# Patient Record
Sex: MALE | Race: WHITE | NOT HISPANIC OR LATINO | Employment: OTHER | URBAN - METROPOLITAN AREA
[De-identification: names, ages, dates, MRNs, and addresses within clinical notes are randomized per-mention and may not be internally consistent; named-entity substitution may affect disease eponyms.]

---

## 2017-07-25 ENCOUNTER — GENERIC CONVERSION - ENCOUNTER (OUTPATIENT)
Dept: OTHER | Facility: OTHER | Age: 62
End: 2017-07-25

## 2018-03-05 RX ORDER — ATENOLOL 50 MG/1
50 TABLET ORAL 2 TIMES DAILY
COMMUNITY

## 2018-03-05 RX ORDER — LEVOTHYROXINE SODIUM 0.1 MG/1
100 TABLET ORAL DAILY
COMMUNITY
End: 2021-04-20

## 2018-03-05 RX ORDER — TAMSULOSIN HYDROCHLORIDE 0.4 MG/1
0.4 CAPSULE ORAL DAILY
COMMUNITY

## 2018-03-05 RX ORDER — LISINOPRIL 20 MG/1
40 TABLET ORAL DAILY
COMMUNITY

## 2018-03-06 ENCOUNTER — OFFICE VISIT (OUTPATIENT)
Dept: SURGICAL ONCOLOGY | Facility: CLINIC | Age: 63
End: 2018-03-06
Payer: COMMERCIAL

## 2018-03-06 VITALS
HEART RATE: 87 BPM | HEIGHT: 69 IN | TEMPERATURE: 97.6 F | WEIGHT: 197 LBS | SYSTOLIC BLOOD PRESSURE: 146 MMHG | RESPIRATION RATE: 16 BRPM | BODY MASS INDEX: 29.18 KG/M2 | DIASTOLIC BLOOD PRESSURE: 88 MMHG

## 2018-03-06 DIAGNOSIS — C18.9 MALIGNANT NEOPLASM OF COLON, UNSPECIFIED PART OF COLON (HCC): Primary | ICD-10-CM

## 2018-03-06 PROCEDURE — 99213 OFFICE O/P EST LOW 20 MIN: CPT | Performed by: SURGERY

## 2018-03-06 RX ORDER — OMEPRAZOLE 40 MG/1
40 CAPSULE, DELAYED RELEASE ORAL DAILY
COMMUNITY
End: 2021-04-20

## 2018-03-06 NOTE — LETTER
March 6, 2018     Klever Diallo, [facility] [address]    Patient: Padmini Nava   YOB: 1955   Date of Visit: 3/6/2018       Dear Dr Vahe Restrepo: Thank you for referring Padmini Nava to me for evaluation  Below are my notes for this consultation  If you have questions, please do not hesitate to call me  I look forward to following your patient along with you  Sincerely,        Narda Cleveland MD        CC: MD Paul Duarte MD Dottie Spring, MD  3/6/2018 11:43 AM  Sign at close encounter               Surgical Oncology Follow Up       [address]  1955  [id number]  [address] [facility] SURGICAL ONCOLOGY [address] [address]    No chief complaint on file  No history exists  Diagnosis and Staging: [id number] colon carcinoma June 2012   Treatment History: Ilio transverse colectomy in June 2012  Adjuvant Chemotherapy XELOX   Current Therapy: Observation   Disease Status: LUPIS       History of Present Illness:   Patient returns in follow-up of his colon carcinoma  He is doing well at this time with no complaints  His last imaging last year was stable  His CEA has been normal  He recently developed some epigastric discomfort  He was ultimately diagnosed with gastritis after upper endoscopy  He is on Prilosec and takes a Zantac as well and this has markedly improved  His appetite is good  No unintentional weight loss  He is up-to-date on colonoscopy  Review of Systems  Complete ROS Surg Onc:   Complete ROS Surg Onc:   Constitutional: The patient denies new or recent history of general fatigue, no recent weight loss, no change in appetite  Eyes: No complaints of visual problems, no scleral icterus     ENT: no complaints of ear pain, no hoarseness, no difficulty swallowing,  no tinnitus and no new masses in head, oral cavity, or neck  Cardiovascular: No complaints of chest pain, no palpitations, no ankle edema  Respiratory: No complaints of shortness of breath, no cough  Gastrointestinal: No complaints of jaundice, no bloody stools, no pale stools  Genitourinary: No complaints of dysuria, no hematuria, no nocturia, no frequent urination, no urethral discharge  Musculoskeletal: No complaints of weakness, paralysis, joint stiffness or arthralgias  Integumentary: No complaints of rash, no new lesions  Neurological: No complaints of convulsions, no seizures, no dizziness  Hematologic/Lymphatic: No complaints of easy bruising  Endocrine:  No hot or cold intolerance  No polydipsia, polyphagia, or polyuria  Allergy/immunology:  No environmental allergies  No food allergies  Not immunocompromised  Skin:  No pallor or rash  No wound  Patient Active Problem List   Diagnosis    Hypertension    Hypothyroidism    Renal colic     No past medical history on file  No past surgical history on file  No family history on file  Social History     Social History    Marital status: /Civil Union     Spouse name: N/A    Number of children: N/A    Years of education: N/A     Occupational History    Not on file       Social History Main Topics    Smoking status: Never Smoker    Smokeless tobacco: Never Used    Alcohol use Not on file    Drug use: Unknown    Sexual activity: Not on file     Other Topics Concern    Not on file     Social History Narrative    No narrative on file       Current Outpatient Prescriptions:     omeprazole (PriLOSEC) 40 MG capsule, Take 40 mg by mouth daily, Disp: , Rfl:     atenolol (TENORMIN) 50 mg tablet, Take by mouth, Disp: , Rfl:     levothyroxine (SYNTHROID) 150 mcg tablet, Take by mouth, Disp: , Rfl:     lisinopril (ZESTRIL) 5 mg tablet, Take by mouth, Disp: , Rfl:     tamsulosin (FLOMAX) 0 4 mg, Take by mouth, Disp: , Rfl:   Allergies   Allergen Reactions    Sulfa Antibiotics Hives and Rash     Vitals:    03/06/18 1016   BP: 146/88   Pulse: 87   Resp: 16   Temp: 97 6 °F (36 4 °C)       Physical Exam  Constitutional: General appearance: The Patient is well-developed and well-nourished who appears the stated age in no acute distress  Patient is pleasant and talkative  HEENT:  Normocephalic  Sclerae are anicteric  Mucous membranes are moist  Neck is supple without adenopathy  No JVD  Chest: The lungs are clear to auscultation  Cardiac: Heart is regular rate  Abdomen: Abdomen is soft, non-tender, non-distended and without masses  Extremities: There is no clubbing or cyanosis  There is no edema  Symmetric  Neuro: Grossly nonfocal  Gait is normal      Lymphatic: No evidence of cervical adenopathy bilaterally  No evidence of axillary adenopathy bilaterally  No evidence of inguinal adenopathy bilaterally  Skin: Warm, anicteric  Psych:  Patient is pleasant and talkative  Pathology:      Labs:      Imaging  No results found  I reviewed the above laboratory and imaging data  Discussion/Summary:     78-year-old male status post ilio transverse colectomy for [id number] colon carcinoma  He is clinically LUPIS at nearly 6 years  His CEA has been normal  He is doing very well  Since he is over 5 years out from his surgery and he continues to follow up with his medical oncologist, I will see him again in the future if any of his imaging or endoscopy is abnormal   He is agreeable to this  All his questions were answered

## 2018-03-06 NOTE — PROGRESS NOTES
Surgical Oncology Follow Up       [address] [facility],[address] [facility]  [facility] SURGICAL ONCOLOGY [facility] [address] [place]  1955  [id number]  [address] [facility],[address] [place] [address]  [facility] 197 [id number]    No chief complaint on file  No history exists  Diagnosis and Staging: [id number] colon carcinoma June 2012   Treatment History: Ilio transverse colectomy in June 2012  Adjuvant Chemotherapy XELOX   Current Therapy: Observation   Disease Status: LUPIS       History of Present Illness:   Patient returns in follow-up of his colon carcinoma  He is doing well at this time with no complaints  His last imaging last year was stable  His CEA has been normal  He recently developed some epigastric discomfort  He was ultimately diagnosed with gastritis after upper endoscopy  He is on Prilosec and takes a Zantac as well and this has markedly improved  His appetite is good  No unintentional weight loss  He is up-to-date on colonoscopy  Review of Systems  Complete ROS Surg Onc:   Complete ROS Surg Onc:   Constitutional: The patient denies new or recent history of general fatigue, no recent weight loss, no change in appetite  Eyes: No complaints of visual problems, no scleral icterus  ENT: no complaints of ear pain, no hoarseness, no difficulty swallowing,  no tinnitus and no new masses in head, oral cavity, or neck  Cardiovascular: No complaints of chest pain, no palpitations, no ankle edema  Respiratory: No complaints of shortness of breath, no cough  Gastrointestinal: No complaints of jaundice, no bloody stools, no pale stools  Genitourinary: No complaints of dysuria, no hematuria, no nocturia, no frequent urination, no urethral discharge  Musculoskeletal: No complaints of weakness, paralysis, joint stiffness or arthralgias  Integumentary: No complaints of rash, no new lesions  Neurological: No complaints of convulsions, no seizures, no dizziness  Hematologic/Lymphatic: No complaints of easy bruising  Endocrine:  No hot or cold intolerance  No polydipsia, polyphagia, or polyuria  Allergy/immunology:  No environmental allergies  No food allergies  Not immunocompromised  Skin:  No pallor or rash  No wound  Patient Active Problem List   Diagnosis    Hypertension    Hypothyroidism    Renal colic     No past medical history on file  No past surgical history on file  No family history on file  Social History     Social History    Marital status: /Civil Union     Spouse name: N/A    Number of children: N/A    Years of education: N/A     Occupational History    Not on file  Social History Main Topics    Smoking status: Never Smoker    Smokeless tobacco: Never Used    Alcohol use Not on file    Drug use: Unknown    Sexual activity: Not on file     Other Topics Concern    Not on file     Social History Narrative    No narrative on file       Current Outpatient Prescriptions:     omeprazole (PriLOSEC) 40 MG capsule, Take 40 mg by mouth daily, Disp: , Rfl:     atenolol (TENORMIN) 50 mg tablet, Take by mouth, Disp: , Rfl:     levothyroxine (SYNTHROID) 150 mcg tablet, Take by mouth, Disp: , Rfl:     lisinopril (ZESTRIL) 5 mg tablet, Take by mouth, Disp: , Rfl:     tamsulosin (FLOMAX) 0 4 mg, Take by mouth, Disp: , Rfl:   Allergies   Allergen Reactions    Sulfa Antibiotics Hives and Rash     Vitals:    03/06/18 1016   BP: 146/88   Pulse: 87   Resp: 16   Temp: 97 6 °F (36 4 °C)       Physical Exam  Constitutional: General appearance: The Patient is well-developed and well-nourished who appears the stated age in no acute distress  Patient is pleasant and talkative  HEENT:  Normocephalic  Sclerae are anicteric  Mucous membranes are moist  Neck is supple without adenopathy  No JVD  Chest: The lungs are clear to auscultation       Cardiac: Heart is regular rate  Abdomen: Abdomen is soft, non-tender, non-distended and without masses  Extremities: There is no clubbing or cyanosis  There is no edema  Symmetric  Neuro: Grossly nonfocal  Gait is normal      Lymphatic: No evidence of cervical adenopathy bilaterally  No evidence of axillary adenopathy bilaterally  No evidence of inguinal adenopathy bilaterally  Skin: Warm, anicteric  Psych:  Patient is pleasant and talkative  Pathology:      Labs:      Imaging  No results found  I reviewed the above laboratory and imaging data  Discussion/Summary:     75-year-old male status post ilio transverse colectomy for Q1T0cC4 colon carcinoma  He is clinically LUPIS at nearly 6 years  His CEA has been normal  He is doing very well  Since he is over 5 years out from his surgery and he continues to follow up with his medical oncologist, I will see him again in the future if any of his imaging or endoscopy is abnormal   He is agreeable to this  All his questions were answered

## 2021-04-20 ENCOUNTER — OFFICE VISIT (OUTPATIENT)
Dept: ENDOCRINOLOGY | Facility: CLINIC | Age: 66
End: 2021-04-20
Payer: MEDICARE

## 2021-04-20 VITALS
DIASTOLIC BLOOD PRESSURE: 92 MMHG | HEIGHT: 70 IN | WEIGHT: 189.5 LBS | SYSTOLIC BLOOD PRESSURE: 122 MMHG | HEART RATE: 70 BPM | BODY MASS INDEX: 27.13 KG/M2

## 2021-04-20 DIAGNOSIS — N23 RENAL COLIC: ICD-10-CM

## 2021-04-20 DIAGNOSIS — E89.0 POSTOPERATIVE HYPOTHYROIDISM: Primary | ICD-10-CM

## 2021-04-20 DIAGNOSIS — R79.89 LOW TSH LEVEL: ICD-10-CM

## 2021-04-20 DIAGNOSIS — C73 THYROID CANCER (HCC): ICD-10-CM

## 2021-04-20 PROCEDURE — 99204 OFFICE O/P NEW MOD 45 MIN: CPT | Performed by: INTERNAL MEDICINE

## 2021-04-20 RX ORDER — LEVOTHYROXINE SODIUM 112 UG/1
112 TABLET ORAL DAILY
Qty: 30 TABLET | Refills: 6 | Status: SHIPPED | OUTPATIENT
Start: 2021-04-20

## 2021-04-20 RX ORDER — BIOTIN 5 MG
TABLET ORAL
COMMUNITY

## 2021-04-20 RX ORDER — ATENOLOL 50 MG/1
50 TABLET ORAL DAILY
COMMUNITY
End: 2021-04-20

## 2021-04-20 NOTE — PROGRESS NOTES
Stu Baez 77 y o  male MRN: [id number]    Encounter: [id number]      Assessment/Plan     Problem List Items Addressed This Visit        Endocrine    Hypothyroidism - Primary     TSH normalized but he is reporting fatigue-for now increase levothyroxine to 112 mcg daily and repeat thyroid function test in the next 6 weeks         Relevant Medications    levothyroxine 112 mcg tablet    Other Relevant Orders    T4, free Lab Collect    TSH, 3rd generation Lab Collect    Thyroid cancer [facility])     Reports now history of recurrent cancer-will get a baseline thyroglobulin level  Request prior records         Relevant Medications    levothyroxine 112 mcg tablet    Other Relevant Orders    Thyroglobulin w/ab Lab Collect       Other    Low TSH level    Renal colic          CC:   Hypothyroidism and low TSH    History of Present Illness     HPI:  24-year-old male referred here for evaluation of hypothyroidism and low TSH  He gives history of   thyroid cancer at age  age 34 - underwent completion thyroidectomy and FORD ablation  ( follicular and papillary )  History of Colon cancer - had surgery and chemo after that - 2012     For postsurgical hypothyroidism he was on LT4 150 mcg daily for many years -takes regularly and properly  Labs earlier this year showed a suppressed TSH and dose was decreased to 100 mcg daily  Weight stable , no palpitations , usually has frequent and loose BMs   No sleep disturbances   No mood issues   Has been on LT4 100 mcg since feb  - has been more fatigued     Brother and nephew  had thyroid cancer and     Daughter had thyroidectomy for MNG         Review of Systems    Historical Information   No past medical history on file    Past Surgical History:   Procedure Laterality Date    SUBTOTAL COLECTOMY      TOTAL THYROIDECTOMY       Social History   Social History     Substance and Sexual Activity   Alcohol Use Yes    Comment: Social      Social History     Substance and Sexual Activity Drug Use Not Currently     Social History     Tobacco Use   Smoking Status Never Smoker   Smokeless Tobacco Never Used     Family History:   Family History   Problem Relation Age of Onset    Thyroid cancer Brother     Thyroid disease unspecified Brother     Thyroid cancer Family     Thyroid disease unspecified Family     Breast cancer Mother     Lung cancer Mother     Ovarian cancer Mother     Lung cancer Father        Meds/Allergies   Current Outpatient Medications   Medication Sig Dispense Refill    ALLOPURINOL  mg daily       atenolol (TENORMIN) 50 mg tablet Take 50 mg by mouth daily       Cholecalciferol 10 MCG (400 UNIT) CAPS Take 5,000 Units by mouth daily      Krill Oil 1000 MG CAPS Taking 1 capsule (400 mg) daily      lisinopril (ZESTRIL) 5 mg tablet Take 20 mg by mouth daily       tamsulosin (FLOMAX) 0 4 mg Take 0 4 mg by mouth daily       verapamil (CALAN-SR) 120 mg CR tablet Take 120 mg by mouth daily at bedtime       levothyroxine 112 mcg tablet Take 1 tablet (112 mcg total) by mouth daily 30 tablet 6     No current facility-administered medications for this visit  Allergies   Allergen Reactions    Sulfa Antibiotics Hives and Rash       Objective   Vitals: Blood pressure 122/92, pulse 70, height 5' 10" (1 778 m), weight 86 kg (189 lb 8 oz)  Physical Exam  Vitals signs reviewed  Constitutional:       Appearance: Normal appearance  He is not ill-appearing or diaphoretic  HENT:      Head: Normocephalic and atraumatic  Eyes:      General: No scleral icterus  Extraocular Movements: Extraocular movements intact  Neck:      Musculoskeletal: Neck supple  Comments: Anterior neck surgical scar-no masses  Cardiovascular:      Rate and Rhythm: Normal rate and regular rhythm  Heart sounds: Normal heart sounds  No murmur  Pulmonary:      Effort: Pulmonary effort is normal  No respiratory distress  Breath sounds: Normal breath sounds  No wheezing or rales  Abdominal:      Palpations: Abdomen is soft  Skin:     General: Skin is warm and dry  Neurological:      General: No focal deficit present  Mental Status: He is alert and oriented to person, place, and time  Psychiatric:         Mood and Affect: Mood normal          Behavior: Behavior normal          Thought Content: Thought content normal          Judgment: Judgment normal          The history was obtained from the review of the chart, patient and family  Lab Results:     TSH WITH FREE T4 IF INDICATED  Order: [id number]  (suggestion)  Information displayed in this report will not trend or trigger automated decision support  Ref Range & Units 1/2/21 10:09 AM   TSH 0 27 - 4 2 uIU/mL 0 06Low     FREE T4 REFLEXIVE 0 9 - 1 7 ng/dL 2  [id number]           Imaging Studies:            Portions of the record may have been created with voice recognition software  Occasional wrong word or "sound a like" substitutions may have occurred due to the inherent limitations of voice recognition software  Read the chart carefully and recognize, using context, where substitutions have occurred

## 2021-04-23 ENCOUNTER — TELEPHONE (OUTPATIENT)
Dept: ENDOCRINOLOGY | Facility: CLINIC | Age: 66
End: 2021-04-23

## 2021-04-23 NOTE — TELEPHONE ENCOUNTER
Pt called today, requesting a call back to go over on his med list since when he was here for the first time he did not have his current active med list with him  Called pt back, no answer,left message requesting a call back

## 2021-04-29 ENCOUNTER — TELEPHONE (OUTPATIENT)
Dept: ENDOCRINOLOGY | Facility: CLINIC | Age: 66
End: 2021-04-29

## 2021-04-29 ENCOUNTER — DOCUMENTATION (OUTPATIENT)
Dept: ENDOCRINOLOGY | Facility: CLINIC | Age: 66
End: 2021-04-29

## 2021-04-29 NOTE — TELEPHONE ENCOUNTER
Please update med list - it takes 4-6 weeks for thyroid labs to stabilize , repeat labs 4 weeks  after he was started on new dose

## 2021-04-29 NOTE — TELEPHONE ENCOUNTER
Left patient message regarding thyroid labs and medication  And repeat labs in 4 weeks     Med list updated

## 2021-04-29 NOTE — TELEPHONE ENCOUNTER
Pt called and wanted to provided list of current medications  Wanted to make sure we have the correct doses  sked to give the info to Dr Pickard Kras know    Levothyroxine 112 mcg 1 tablet once a day  Krill Oil 1000 mg Take 1 capsule by mouth daily  Cholecalciferol Take 5,000 Units by mouth daily  Atenolol 50 mg Take 50 mg by mouth daily   Lisinopril 20 mg by mouth daily   Allopurinol 300 mg 1 tablet daily  tamsulosin 0 4 mg by mouth daily   Verapamil 120 mg 1 tablet before bed    He stated he stated that he dose for the levothyroxine was changed to 112 mcg 2 weeks ago, he is feeling very sluggish  Wants to know if he needs to wait a little longer to see if the new dose will make him feel better or will he need another adjustment       I updated med list

## 2021-05-04 NOTE — ASSESSMENT & PLAN NOTE
TSH normalized but he is reporting fatigue-for now increase levothyroxine to 112 mcg daily and repeat thyroid function test in the next 6 weeks

## 2021-07-28 ENCOUNTER — OFFICE VISIT (OUTPATIENT)
Dept: ENDOCRINOLOGY | Facility: CLINIC | Age: 66
End: 2021-07-28
Payer: MEDICARE

## 2021-07-28 VITALS
HEIGHT: 70 IN | WEIGHT: 191.2 LBS | BODY MASS INDEX: 27.37 KG/M2 | DIASTOLIC BLOOD PRESSURE: 84 MMHG | HEART RATE: 68 BPM | SYSTOLIC BLOOD PRESSURE: 142 MMHG

## 2021-07-28 DIAGNOSIS — R53.82 CHRONIC FATIGUE: ICD-10-CM

## 2021-07-28 DIAGNOSIS — E89.0 POSTOPERATIVE HYPOTHYROIDISM: Primary | ICD-10-CM

## 2021-07-28 DIAGNOSIS — C73 THYROID CANCER (HCC): ICD-10-CM

## 2021-07-28 DIAGNOSIS — R53.83 FATIGUE, UNSPECIFIED TYPE: ICD-10-CM

## 2021-07-28 PROBLEM — E55.9 VITAMIN D DEFICIENCY: Status: RESOLVED | Noted: 2021-07-28 | Resolved: 2021-07-28

## 2021-07-28 PROBLEM — E61.1 IRON DEFICIENCY: Status: ACTIVE | Noted: 2021-07-28

## 2021-07-28 PROBLEM — E61.1 IRON DEFICIENCY: Status: RESOLVED | Noted: 2021-07-28 | Resolved: 2021-07-28

## 2021-07-28 PROBLEM — E55.9 VITAMIN D DEFICIENCY: Status: ACTIVE | Noted: 2021-07-28

## 2021-07-28 PROCEDURE — 99214 OFFICE O/P EST MOD 30 MIN: CPT | Performed by: NURSE PRACTITIONER

## 2021-07-28 NOTE — PROGRESS NOTES
Established Patient Progress Note       Chief Complaint   Patient presents with    Hypothyroidism        History of Present Illness:     Blake Mckeon is a 74 y o  male with a history of thyroid cancer and postsurgical hypothyroidism  He reports he underwent completion thyroidectomy and FORD ablation for follicular and papillary thyroid cancer at age 34  Medical records requested last visit  Only received records for his history of colon cancer, nothing received for his thyroid cancer history  For the colon cancer he had surgery and chemo after that in 2012  Thyroglobulin panel ordered at last visit, not completed by outside lab  For the hypothyroidism he reports he was taking Levothyroxine 112 mcg but also occasionally taking 150 mcg  He can not recall how many days he was taking 150 vs 112, but believes it was mostly 112 mcg  He reports fatigue  TSH low normal with normal free T4  Patient Active Problem List   Diagnosis    Hypertension    Hypothyroidism    Renal colic    Thyroid cancer (HCC)    Low TSH level    Fatigue      History reviewed  No pertinent past medical history     Past Surgical History:   Procedure Laterality Date    SUBTOTAL COLECTOMY      TOTAL THYROIDECTOMY        Family History   Problem Relation Age of Onset    Thyroid cancer Brother     Thyroid disease unspecified Brother     Thyroid cancer Family     Thyroid disease unspecified Family     Breast cancer Mother     Lung cancer Mother     Ovarian cancer Mother     Lung cancer Father      Social History     Tobacco Use    Smoking status: Never Smoker    Smokeless tobacco: Never Used   Substance Use Topics    Alcohol use: Yes     Comment: Social      Allergies   Allergen Reactions    Sulfa Antibiotics Hives and Rash       Current Outpatient Medications:     ALLOPURINOL PO, 300 mg daily , Disp: , Rfl:     atenolol (TENORMIN) 50 mg tablet, Take 50 mg by mouth daily , Disp: , Rfl:     Cholecalciferol 10 MCG (400 UNIT) CAPS, Take 5,000 Units by mouth daily, Disp: , Rfl:     Krill Oil 1000 MG CAPS, Taking 1 capsule (400 mg) daily, Disp: , Rfl:     levothyroxine 112 mcg tablet, Take 1 tablet (112 mcg total) by mouth daily, Disp: 30 tablet, Rfl: 6    lisinopril (ZESTRIL) 5 mg tablet, Take 20 mg by mouth daily , Disp: , Rfl:     tamsulosin (FLOMAX) 0 4 mg, Take 0 4 mg by mouth daily , Disp: , Rfl:     verapamil (CALAN-SR) 120 mg CR tablet, Take 120 mg by mouth daily at bedtime , Disp: , Rfl:     Review of Systems   Constitutional: Positive for fatigue  Negative for activity change and appetite change  HENT: Negative for sore throat, trouble swallowing and voice change  Eyes: Negative for visual disturbance  Respiratory: Negative for choking, chest tightness and shortness of breath  Cardiovascular: Negative for chest pain, palpitations and leg swelling  Gastrointestinal: Negative for abdominal pain, constipation and diarrhea  Endocrine: Negative for cold intolerance, heat intolerance, polydipsia, polyphagia and polyuria  Genitourinary: Negative for frequency  Musculoskeletal: Negative for arthralgias and myalgias  Skin: Negative for rash  Neurological: Negative for dizziness and syncope  Hematological: Negative for adenopathy  Psychiatric/Behavioral: Negative for sleep disturbance  All other systems reviewed and are negative  Physical Exam:  Body mass index is 27 43 kg/m²  /84   Pulse 68   Ht 5' 10" (1 778 m)   Wt 86 7 kg (191 lb 3 2 oz)   BMI 27 43 kg/m²    Wt Readings from Last 3 Encounters:   07/28/21 86 7 kg (191 lb 3 2 oz)   04/20/21 86 kg (189 lb 8 oz)   03/06/18 89 4 kg (197 lb)       Physical Exam  Vitals reviewed  Constitutional:       General: He is not in acute distress  Appearance: He is well-developed  HENT:      Head: Normocephalic and atraumatic  Eyes:      Conjunctiva/sclera: Conjunctivae normal       Pupils: Pupils are equal, round, and reactive to light  Neck:      Thyroid: No thyromegaly  Cardiovascular:      Rate and Rhythm: Normal rate and regular rhythm  Heart sounds: Normal heart sounds  No murmur heard  Pulmonary:      Effort: Pulmonary effort is normal  No respiratory distress  Breath sounds: Normal breath sounds  No wheezing or rales  Abdominal:      General: Bowel sounds are normal  There is no distension  Palpations: Abdomen is soft  Tenderness: There is no abdominal tenderness  Musculoskeletal:         General: Normal range of motion  Cervical back: Normal range of motion and neck supple  Lymphadenopathy:      Cervical: No cervical adenopathy  Skin:     General: Skin is warm and dry  Neurological:      Mental Status: He is alert and oriented to person, place, and time  Labs:     7/06/21    TSH 0 46, free T4 1 01    Impression & Plan:    Problem List Items Addressed This Visit        Endocrine    Hypothyroidism - Primary     Advised patient to take Levothyroxine 112 mg consistently and will check thyroid function test 4-6 weeks  Based on results will determine if dose adjustment is needed  Do not take any 150 mcg tablets  Relevant Orders    T4, free    TSH, 3rd generation    Thyroid cancer ([facility] [address] )     Will request records from previous provider  New script provider for thyroglobulin panel as not completed by lab         Relevant Orders    Thyroglobulin       Other    Fatigue    Relevant Orders    T4, free    TSH, 3rd generation    Vitamin D 25 hydroxy    CBC and differential Lab Collect    Iron Panel (Includes Ferritin, Iron Sat%, Iron, and TIBC)          Orders Placed This Encounter   Procedures    T4, free     Standing Status:   Future     Standing Expiration Date:   7/28/2022    TSH, 3rd generation     This is a patient instruction: This test is non-fasting  Please drink two glasses of water morning of bloodwork          Standing Status:   Future     Standing Expiration Date:   7/28/2022   Kimberly Garcia Thyroglobulin     Thyroglobulin PANEL-- includes both quantitative thyroglobulin and thyroglobulin Antibody     Standing Status:   Future     Standing Expiration Date:   7/28/2022    Vitamin D 25 hydroxy     Standing Status:   Future     Standing Expiration Date:   7/28/2022    CBC and differential Lab Collect     This is a patient instruction: This test is non-fasting  Please drink two glasses of water morning of bloodwork  Standing Status:   Future     Standing Expiration Date:   7/28/2022       Discussed with the patient and all questioned fully answered  He will call me if any problems arise  Follow-up appointment in 6 months       Counseled patient on diagnostic results, prognosis, risk and benefit of treatment options, instruction for management, importance of treatment compliance, Risk  factor reduction and impressions      Dutch Prado [address]

## 2021-07-28 NOTE — ASSESSMENT & PLAN NOTE
Advised patient to take Levothyroxine 112 mg consistently and will check thyroid function test 4-6 weeks  Based on results will determine if dose adjustment is needed  Do not take any 150 mcg tablets

## 2021-07-28 NOTE — ASSESSMENT & PLAN NOTE
Will request records from previous provider   New script provider for thyroglobulin panel as not completed by lab

## 2022-01-03 ENCOUNTER — TELEPHONE (OUTPATIENT)
Dept: ENDOCRINOLOGY | Facility: CLINIC | Age: 67
End: 2022-01-03

## 2022-01-03 NOTE — TELEPHONE ENCOUNTER
Patients wife called want to make 1/26th appointment virtual they are well over and hour away is this ok  Also they need updated lab work she has from August   Zarina Dry!

## 2022-01-26 ENCOUNTER — TELEPHONE (OUTPATIENT)
Dept: ENDOCRINOLOGY | Facility: CLINIC | Age: 67
End: 2022-01-26

## 2022-01-26 ENCOUNTER — TELEMEDICINE (OUTPATIENT)
Dept: ENDOCRINOLOGY | Facility: CLINIC | Age: 67
End: 2022-01-26
Payer: MEDICARE

## 2022-01-26 DIAGNOSIS — C73 THYROID CANCER (HCC): Primary | ICD-10-CM

## 2022-01-26 DIAGNOSIS — E89.0 POSTOPERATIVE HYPOTHYROIDISM: ICD-10-CM

## 2022-01-26 PROCEDURE — 99214 OFFICE O/P EST MOD 30 MIN: CPT | Performed by: INTERNAL MEDICINE

## 2022-01-26 RX ORDER — MULTIVITAMIN/IRON/FOLIC ACID 18MG-0.4MG
TABLET ORAL
COMMUNITY
Start: 2022-01-13

## 2022-01-26 RX ORDER — CALCIUM CARBONATE 260MG(650)
TABLET,CHEWABLE ORAL
COMMUNITY

## 2022-01-26 RX ORDER — VITAMIN B COMPLEX
1 CAPSULE ORAL DAILY
COMMUNITY

## 2022-01-26 RX ORDER — HYDROCHLOROTHIAZIDE 25 MG/1
25 TABLET ORAL DAILY
COMMUNITY
Start: 2021-11-24

## 2022-01-26 NOTE — TELEPHONE ENCOUNTER
----- Message from Reva Jonas MD sent at 1/26/2022 12:35 PM EST -----  Hi,  Please reprint lab orders from 7/28/21 and send to patient along with AVS  Thanks

## 2022-01-26 NOTE — PROGRESS NOTES
The patient was identified by name and date of birth  Was informed that this is a virtual visit and that the visit is being conducted through [address] [place] [address] and patient was informed that this may not be a secure, HIPAA-complaint platform  Patient agreed to proceed  Patient privacy was secured with closed door and no other individual was privy to conversation on my end  Patient acknowledged consent and understanding of privacy and security of the video platform  The patient has agreed to participate and understands they can discontinue the visit at any time  Patient is aware this is a billable service  Follow up Patient Progress Note      CC: hypothyroidism, thyroid cancer    History of Present Illness:   73yr male with papillary thyroid cancer 28 yrs ago s/p thyroidectomy, colon CA 2012, HTN, chronic fatigue and post surgical hypothyroidism and SDH 1/2021  Last visit was 7/28/21  Since last visit, he has been stable  Did not have thyroglobulin checked but reported recent thyroid testing was unremarkable 12/2021  Results not in chart  He is scheduled to go to [facility] for 2 months next week  His levothyroxine dose is 112mcg qdaily  No recent radiation, recent Iodine loading or radiological diagnostic studies  No difficulty with swallowing or breathing or change in voice  Patient Active Problem List   Diagnosis    Hypertension    Hypothyroidism    Renal colic    [facility]roid cancer ([facility] [address] )    Low TSH level    Fatigue     No past medical history on file     Past Surgical History:   Procedure Laterality Date    SUBTOTAL COLECTOMY      TOTAL THYROIDECTOMY        Family History   Problem Relation Age of Onset    Thyroid cancer Brother     Thyroid disease unspecified Brother     Thyroid cancer Family     Thyroid disease unspecified Family     Breast cancer Mother     Lung cancer Mother     Ovarian cancer Mother     Lung cancer Father      Social History     Tobacco Use    Smoking status: Never Smoker    Smokeless tobacco: Never Used   Substance Use Topics    Alcohol use: Yes     Comment: Social      Allergies   Allergen Reactions    Sulfa Antibiotics Hives and Rash         Review of Systems   Constitutional: Positive for activity change, appetite change and fatigue  HENT: Negative  Eyes: Negative  Respiratory: Negative  Cardiovascular: Negative for chest pain  Gastrointestinal: Negative  Endocrine: Negative  Genitourinary: Negative  Musculoskeletal: Negative  Skin: Negative  Allergic/Immunologic: Negative  Neurological: Negative  Hematological: Negative  Psychiatric/Behavioral: Negative  All other systems reviewed and are negative  Current Outpatient Medications:     ALLOPURINOL PO, 300 mg daily , Disp: , Rfl:     atenolol (TENORMIN) 50 mg tablet, Take 50 mg by mouth 2 (two) times a day  , Disp: , Rfl:     b complex vitamins capsule, Take 1 capsule by mouth daily, Disp: , Rfl:     Bromelains (BROMELAIN PO), Take by mouth, Disp: , Rfl:     Cholecalciferol 10 MCG (400 UNIT) CAPS, Take 5,000 Units by mouth daily, Disp: , Rfl:     hydrochlorothiazide (HYDRODIURIL) 25 mg tablet, 25 mg daily  , Disp: , Rfl:     Krill Oil 1000 MG CAPS, Taking 1 capsule (400 mg) daily, Disp: , Rfl:     levothyroxine 112 mcg tablet, Take 1 tablet (112 mcg total) by mouth daily, Disp: 30 tablet, Rfl: 6    lisinopril (ZESTRIL) 20 mg tablet, Take 40 mg by mouth daily  , Disp: , Rfl:     Magnesium Oxide 250 MG TABS, Take 2 tabs daily as tolerated  , Disp: , Rfl:     Misc Natural Products (PROSTATE SUPPORT PO), Take by mouth, Disp: , Rfl:     tamsulosin (FLOMAX) 0 4 mg, Take 0 4 mg by mouth daily , Disp: , Rfl:     Zinc 10 MG LOZG, Apply to the mouth or throat, Disp: , Rfl:     verapamil (CALAN-SR) 120 mg CR tablet, Take 120 mg by mouth daily at bedtime  (Patient not taking: Reported on 1/26/2022 ), Disp: , Rfl:     Physical Exam:  There is no height or weight on file to calculate BMI   There were no vitals taken for this visit  Physical Exam  Constitutional:       General: He is not in acute distress  Appearance: He is well-developed  HENT:      Head: Normocephalic and atraumatic  Nose: Nose normal    Eyes:      Conjunctiva/sclera: Conjunctivae normal    Pulmonary:      Effort: Pulmonary effort is normal    Abdominal:      General: There is no distension  Musculoskeletal:      Cervical back: Normal range of motion and neck supple  Skin:     Findings: No rash  Comments: No icterus   Neurological:      Mental Status: He is alert and oriented to person, place, and time  Labs:     No results found for: [id number], TSH, H9BVPRC, K4NFBVX, THYROIDAB    No results found for: CREATININE, BUN, NA, K, CL, CO2  No results found for: EGFR    No results found for: ALT, AST, GGT, ALKPHOS, BILITOT    No results found for: CHOLESTEROL  No results found for: HDL  No results found for: TRIG  No results found for: NONHDLC      Impression:  1  Thyroid cancer ([facility] )    2  Postoperative hypothyroidism         Plan:    Diagnoses and all orders for this visit:    Thyroid cancer ([facility] )  He has remote hx of thyroidectomy and FORD ablation for a papillary thyroid cancer 28 yrs ago and stability since  Agree with relaxation of TSH suppression as a strategy in context of remote hx of thyroid CA, increasing age and high BP with SDH  Will however need a negative thyroglobulin with negative Tg antibody to elucidate this plan  His TSH goal should be 0 5-2uIU/mL  Advised to complete labs ordered last visit  Follow up in 6 months  Postoperative hypothyroidism  Continue levothyroxine 112mcg qdaily  Aim for TSH 0 5-2uIU/mL and a fT4>1ng/mL  Will titrate based on labs  Fatigue  Seems to have improved  Rule out GODFREY/Vit D deficiency and iron deficiency        I have spent 30 minutes with patient today in which greater than 50% of this time was spent in counseling/coordination of care     All questioned fully answered  He will call me if any problems arise  Educated/ Counseled patient on diagnostic test results, prognosis, risk vs benefit of treatment options, importance of treatment compliance, healthy life and lifestyle choices        Ginette Sloan

## 2022-01-28 ENCOUNTER — LAB (OUTPATIENT)
Dept: LAB | Facility: HOSPITAL | Age: 67
End: 2022-01-28
Attending: INTERNAL MEDICINE
Payer: MEDICARE

## 2022-01-28 DIAGNOSIS — C73 THYROID CANCER (HCC): ICD-10-CM

## 2022-01-28 DIAGNOSIS — E89.0 POSTOPERATIVE HYPOTHYROIDISM: ICD-10-CM

## 2022-01-28 DIAGNOSIS — R53.82 CHRONIC FATIGUE: ICD-10-CM

## 2022-01-28 LAB
25(OH)D3 SERPL-MCNC: 17.9 NG/ML (ref 30–100)
BASOPHILS # BLD AUTO: 0.07 THOUSANDS/ΜL (ref 0–0.1)
BASOPHILS NFR BLD AUTO: 1 % (ref 0–1)
EOSINOPHIL # BLD AUTO: 0.18 THOUSAND/ΜL (ref 0–0.61)
EOSINOPHIL NFR BLD AUTO: 2 % (ref 0–6)
ERYTHROCYTE [DISTWIDTH] IN BLOOD BY AUTOMATED COUNT: 13.6 % (ref 11.6–15.1)
FERRITIN SERPL-MCNC: 131 NG/ML (ref 8–388)
HCT VFR BLD AUTO: 50.8 % (ref 36.5–49.3)
HGB BLD-MCNC: 17 G/DL (ref 12–17)
IMM GRANULOCYTES # BLD AUTO: 0.04 THOUSAND/UL (ref 0–0.2)
IMM GRANULOCYTES NFR BLD AUTO: 0 % (ref 0–2)
IRON SATN MFR SERPL: 25 % (ref 20–50)
IRON SERPL-MCNC: 97 UG/DL (ref 65–175)
LYMPHOCYTES # BLD AUTO: 3.63 THOUSANDS/ΜL (ref 0.6–4.47)
LYMPHOCYTES NFR BLD AUTO: 39 % (ref 14–44)
MCH RBC QN AUTO: 29.7 PG (ref 26.8–34.3)
MCHC RBC AUTO-ENTMCNC: 33.5 G/DL (ref 31.4–37.4)
MCV RBC AUTO: 89 FL (ref 82–98)
MONOCYTES # BLD AUTO: 1.01 THOUSAND/ΜL (ref 0.17–1.22)
MONOCYTES NFR BLD AUTO: 11 % (ref 4–12)
NEUTROPHILS # BLD AUTO: 4.33 THOUSANDS/ΜL (ref 1.85–7.62)
NEUTS SEG NFR BLD AUTO: 47 % (ref 43–75)
NRBC BLD AUTO-RTO: 0 /100 WBCS
PLATELET # BLD AUTO: 243 THOUSANDS/UL (ref 149–390)
PMV BLD AUTO: 9.7 FL (ref 8.9–12.7)
RBC # BLD AUTO: 5.73 MILLION/UL (ref 3.88–5.62)
T4 FREE SERPL-MCNC: 0.97 NG/DL (ref 0.76–1.46)
TIBC SERPL-MCNC: 395 UG/DL (ref 250–450)
TSH SERPL DL<=0.05 MIU/L-ACNC: 6.65 UIU/ML (ref 0.36–3.74)
WBC # BLD AUTO: 9.26 THOUSAND/UL (ref 4.31–10.16)

## 2022-01-28 PROCEDURE — 84432 ASSAY OF THYROGLOBULIN: CPT

## 2022-01-28 PROCEDURE — 83540 ASSAY OF IRON: CPT

## 2022-01-28 PROCEDURE — 36415 COLL VENOUS BLD VENIPUNCTURE: CPT

## 2022-01-28 PROCEDURE — 82306 VITAMIN D 25 HYDROXY: CPT

## 2022-01-28 PROCEDURE — 85025 COMPLETE CBC W/AUTO DIFF WBC: CPT

## 2022-01-28 PROCEDURE — 83550 IRON BINDING TEST: CPT

## 2022-01-28 PROCEDURE — 86800 THYROGLOBULIN ANTIBODY: CPT

## 2022-01-28 PROCEDURE — 84439 ASSAY OF FREE THYROXINE: CPT

## 2022-01-28 PROCEDURE — 82728 ASSAY OF FERRITIN: CPT

## 2022-01-28 PROCEDURE — 84443 ASSAY THYROID STIM HORMONE: CPT

## 2022-01-29 LAB
THYROGLOB AB SERPL-ACNC: <1 IU/ML (ref 0–0.9)
THYROGLOB SERPL-MCNC: 0.3 NG/ML (ref 1.4–29.2)

## 2022-02-01 NOTE — RESULT ENCOUNTER NOTE
Thyroglobulin and Tg kristel were negative  Thyroid functions show under-treatment - suggest take levothyroxine 112mcg  1tab daily M-S and 2 tabs Sunday  Repeat thyroid testing in 6-8 weeks

## 2022-04-25 ENCOUNTER — TELEPHONE (OUTPATIENT)
Dept: GASTROENTEROLOGY | Facility: MEDICAL CENTER | Age: 67
End: 2022-04-25

## 2022-04-25 NOTE — TELEPHONE ENCOUNTER
Hi,    Can we squeeze him in for a visit with me this week or next week? Maybe next week as a 8am visit when I am in [place]? (but not a Wednesday)  I am okay with a virtual visit if that works for him  Thank you!

## 2022-04-29 ENCOUNTER — TELEPHONE (OUTPATIENT)
Dept: GASTROENTEROLOGY | Facility: CLINIC | Age: 67
End: 2022-04-29

## 2022-04-29 NOTE — TELEPHONE ENCOUNTER
Called pt and left message because I just wanted to verify that he wants his appt on Monday to be virtual  I also wanted to ask if he wanted the appt with Dr Manjit Anguiano that is later in the month

## 2022-04-29 NOTE — TELEPHONE ENCOUNTER
Notification of Inpatient Admission/Inpatient Authorization Request   This is a Notification of Inpatient Admission for [facility]  Be advised that this patient was admitted to our facility under Inpatient Status  Contact Lucillie Snellen at [phone] for additional admission information  [facility]  DEDICATED [phone]  Patient Name:   Jeffery Shafer   YOB: 1956       [address]   [address]:   [place] Ignacio  Tax ID: [id number]  NPI: [id number] Attending Provider/NPI: Evelyn Yan [[id number]]   Place of Service Code: 24     Place of Service Name:  Inpatient Hospital   Start Date: 11/21/19 2006     Discharge Date & Time: No discharge date for patient encounter  Type of Admission: Inpatient Status Discharge Disposition (if discharged): Home/Self Care   Patient Diagnoses: Abdominal pain [R10 9]     Orders: Admission Orders (From admission, onward)     Ordered        11/21/19 2006  Inpatient Admission (expected length of stay for this patient Order details is greater than two midnights)  Once                    Assigned Utilization Review Contact: Lucillie Snellen  Utilization   Network Utilization Review Department  Phone: [phone]; Fax [phone]  Email: [email or web address]   ATTENTION PAYERS: Please call the assigned Utilization  directly with any questions or concerns ALL voicemails in the department are confidential  Send all requests for admission clinical reviews, approved or denied determinations and any other requests to dedicated fax number belonging to the campus where the patient is receiving treatment  Pt called back and confirmed he will keep his appt as an in office visit

## 2022-05-18 PROBLEM — Z85.038 PERSONAL HISTORY OF COLON CANCER, STAGE III: Status: ACTIVE | Noted: 2022-05-18

## 2022-05-18 NOTE — ASSESSMENT & PLAN NOTE
Shira Vo is a 74 y o  male who presents with complaint of stage III colon cancer status post partial colon resection back in 2012  BMP normal, CBC with normal white blood cell count, hemoglobin, platelets  Normal iron studies  Vitamin-D low at 17 9  Prior CMP normal   TSH elevated but normal free T4  No diagnosis found  No orders of the defined types were placed in this encounter      Colonoscopy given personal history of colon cancer  Vitamin-D supplementation daily  Drink at least 8 cups of water per day  Benefiber daily

## 2022-05-18 NOTE — PROGRESS NOTES
[facility] - Outpatient Consultation  Chris Mcduffie 74 y o  male MRN: [id number]  Encounter: [id number]          ASSESSMENT AND PLAN:    Chris Mcduffie is a 74 y o  male who presents with complaint of stage III colon cancer status post partial colon resection back in 2012  He had an episode of hunger pains and epigastric pain, but now improved with omeprazole  Suspect gastritis vs PUD  BMP normal, CBC with normal white blood cell count, hemoglobin, platelets  Normal iron studies  Vitamin-D low at 17 9  Prior CMP normal   TSH elevated but normal free T4     1  Epigastric burning sensation    2  Personal history of colon cancer        No orders of the defined types were placed in this encounter  Colonoscopy given personal history of colon cancer  This is coming up in 1 week  They will see if he can have an EGD at the same time  Continue omeprazole for 1 month total, and then take every other day for 2 weeks and then stop  Avoid spicy foods and alcohol for now  Vitamin-D supplementation daily  Drink at least 8 cups of water per day      ______________________________________________________________________    Referred by Dr Kim Barajas for belching and diarrhea, hunger pains    HPI:    Chris Mcduffie is a 74 y o  male who presents with complaint of hunger pains, bloating and diarrhea  Colon cancer in 2012 s/p colectomy  He did well  2 years ago he had increased gas, hunger pains, rumbling  He saw a GI physician and was started on omeprazole with improvement of symptoms  He went down to [place] and as soon as he got there he was eating spicy foods, drinking alcohol, and he has reoccurrence  Stopped alcohol and spicy foods  He did not take omeprazole  It did not get better  He picked up omeprazole OTC and after 10 days it improved and now symptoms free  When he ate the hunger pains got better  Now feeling much better  No pain yesterday   Since colon surgery he has loud noises  It has quieted down  BMs are fine now  Recent blood work normal including CEA  Liver enzymes up with COVID and rechecked 10 days later and now normal  No BRBPR or black stools  No heartburn, nausea, vomiting  EGD 2 years ago normal        REVIEW OF SYSTEMS:  10 point ROS reviewed and negative, except as above    Historical Information   History reviewed  No pertinent past medical history  Past Surgical History:   Procedure Laterality Date    SUBTOTAL COLECTOMY      TOTAL THYROIDECTOMY       Social History   Social History     Substance and Sexual Activity   Alcohol Use Yes    Comment: Social      Social History     Substance and Sexual Activity   Drug Use Not Currently     Social History     Tobacco Use   Smoking Status Never Smoker   Smokeless Tobacco Never Used     Family History   Problem Relation Age of Onset    Thyroid cancer Brother     Thyroid disease unspecified Brother     Thyroid cancer Family     Thyroid disease unspecified Family     Breast cancer Mother     Lung cancer Mother     Ovarian cancer Mother     Lung cancer Father        Meds/Allergies       Current Outpatient Medications:     ALLOPURINOL PO    atenolol (TENORMIN) 50 mg tablet    b complex vitamins capsule    Bromelains (BROMELAIN PO)    Cholecalciferol 10 MCG (400 UNIT) CAPS    hydrochlorothiazide (HYDRODIURIL) 25 mg tablet    Krill Oil 1000 MG CAPS    levothyroxine 112 mcg tablet    lisinopril (ZESTRIL) 20 mg tablet    Magnesium Oxide 250 MG TABS    Misc Natural Products (PROSTATE SUPPORT PO)    tamsulosin (FLOMAX) 0 4 mg    Zinc 10 MG LOZG    verapamil (CALAN-SR) 120 mg CR tablet    Allergies   Allergen Reactions    Sulfa Antibiotics Hives and Rash           Objective     Blood pressure 138/80, pulse 70, height 5' 9" (1 753 m), weight 88 2 kg (194 lb 6 4 oz), SpO2 96 %  Body mass index is 28 71 kg/m²        PHYSICAL EXAMINATION:    General Appearance:   Alert, cooperative, no distress   HEENT: Normocephalic, atraumatic, anicteric  Neck supple, symmetrical, trachea midline  Lungs:   Equal chest rise and unlabored breathing, normal effort, no coughing  Cardiovascular:   No visualized JVD  Abdomen:   No abdominal distension  Skin:   No jaundice, rashes, or lesions  Musculoskeletal:   Normal range of motion visualized  Psych:  Normal affect and normal insight  Neuro:  Alert and appropriate  Lab Results:   No visits with results within 1 Day(s) from this visit     Latest known visit with results is:   Lab on 01/28/2022   Component Date Value    Free T4 01/28/2022 0 97     TSH 3RD GENERATON 01/28/2022 6 650 (A)    Thyroglobulin Ab 01/28/2022 <1 0     Vit D, 25-Hydroxy 01/28/2022 17 9 (A)    WBC 01/28/2022 9 26     RBC 01/28/2022 5 73 (A)    Hemoglobin 01/28/2022 17 0     Hematocrit 01/28/2022 50 8 (A)    MCV 01/28/2022 89     MCH 01/28/2022 29 7     MCHC 01/28/2022 33 5     RDW 01/28/2022 13 6     MPV 01/28/2022 9 7     Platelets 78/49/6864 243     nRBC 01/28/2022 0     Neutrophils Relative 01/28/2022 47     Immat GRANS % 01/28/2022 0     Lymphocytes Relative 01/28/2022 39     Monocytes Relative 01/28/2022 11     Eosinophils Relative 01/28/2022 2     Basophils Relative 01/28/2022 1     Neutrophils Absolute 01/28/2022 4 33     Immature Grans Absolute 01/28/2022 0 04     Lymphocytes Absolute 01/28/2022 3 63     Monocytes Absolute 01/28/2022 1 01     Eosinophils Absolute 01/28/2022 0 18     Basophils Absolute 01/28/2022 0 07     Iron Saturation 01/28/2022 25     TIBC 01/28/2022 395     Iron 01/28/2022 97     Ferritin 01/28/2022 131     Thyroglobulin-SAUL 01/28/2022 0 3 (A)       Lab Results   Component Value Date    WBC 9 26 01/28/2022    HGB 17 0 01/28/2022    HCT 50 8 (H) 01/28/2022    MCV 89 01/28/2022     01/28/2022       No results found for: NA, SODIUM, K, CL, CO2, ANIONGAP, AGAP, BUN, CREATININE, GLUC, GLUF, CALCIUM, AST, ALT, ALKPHOS, PROT, TP, BILITOT, TBILI, EGFR    No results found for: CRP    Lab Results   Component Value Date    [id number] 6 650 (H) 01/28/2022       Lab Results   Component Value Date    IRON 97 01/28/2022    TIBC 395 01/28/2022    FERRITIN 131 01/28/2022       Radiology Results:   No results found

## 2022-05-19 ENCOUNTER — CONSULT (OUTPATIENT)
Dept: GASTROENTEROLOGY | Facility: MEDICAL CENTER | Age: 67
End: 2022-05-19
Payer: MEDICARE

## 2022-05-19 VITALS
DIASTOLIC BLOOD PRESSURE: 80 MMHG | BODY MASS INDEX: 28.79 KG/M2 | SYSTOLIC BLOOD PRESSURE: 138 MMHG | HEART RATE: 70 BPM | WEIGHT: 194.4 LBS | OXYGEN SATURATION: 96 % | HEIGHT: 69 IN

## 2022-05-19 DIAGNOSIS — Z85.038 PERSONAL HISTORY OF COLON CANCER: ICD-10-CM

## 2022-05-19 DIAGNOSIS — R10.13 EPIGASTRIC BURNING SENSATION: Primary | ICD-10-CM

## 2022-05-19 PROCEDURE — 99204 OFFICE O/P NEW MOD 45 MIN: CPT | Performed by: INTERNAL MEDICINE

## 2022-05-20 ENCOUNTER — TELEPHONE (OUTPATIENT)
Dept: OTHER | Facility: OTHER | Age: 67
End: 2022-05-20

## 2022-05-20 NOTE — TELEPHONE ENCOUNTER
Patient is going for an colonoscopy on Friday 5/27 and he is also suppose to have an endoscopy, but when they picked up the paper work the endoscopy was not on it  Dr Paulie Singh told us if we had any problems with that to call him and he would call to make sure both the colonoscopy and Endoscopy were to be done on Friday  Could you please call Dr Samy Gomez office and ask them to add the endoscopy   His number is [phone]

## 2022-05-23 NOTE — TELEPHONE ENCOUNTER
Called Dr Jill Gordon office  He was not available  Left a VM for him to call back (and what it was regarding)

## 2022-08-30 ENCOUNTER — OFFICE VISIT (OUTPATIENT)
Dept: GASTROENTEROLOGY | Facility: CLINIC | Age: 67
End: 2022-08-30
Payer: MEDICARE

## 2022-08-30 VITALS
HEIGHT: 69 IN | OXYGEN SATURATION: 98 % | BODY MASS INDEX: 28.73 KG/M2 | WEIGHT: 194 LBS | HEART RATE: 72 BPM | SYSTOLIC BLOOD PRESSURE: 128 MMHG | DIASTOLIC BLOOD PRESSURE: 80 MMHG | TEMPERATURE: 98 F

## 2022-08-30 DIAGNOSIS — K22.70 BARRETT'S ESOPHAGUS WITHOUT DYSPLASIA: Primary | ICD-10-CM

## 2022-08-30 DIAGNOSIS — R10.13 EPIGASTRIC BURNING SENSATION: ICD-10-CM

## 2022-08-30 DIAGNOSIS — K21.9 GASTROESOPHAGEAL REFLUX DISEASE, UNSPECIFIED WHETHER ESOPHAGITIS PRESENT: ICD-10-CM

## 2022-08-30 PROCEDURE — 99213 OFFICE O/P EST LOW 20 MIN: CPT | Performed by: INTERNAL MEDICINE

## 2022-08-30 RX ORDER — OMEPRAZOLE 20 MG/1
20 CAPSULE, DELAYED RELEASE ORAL DAILY
Qty: 30 CAPSULE | Refills: 5 | Status: SHIPPED | OUTPATIENT
Start: 2022-08-30

## 2022-08-30 NOTE — PROGRESS NOTES
[facility] - Outpatient Follow-up Note  Shameka Cassidy 69 y o  male MRN: [id number]  Encounter: [id number]          ASSESSMENT AND PLAN:    Shameka Cassidy is a 69 y o  male with prior stage III colon cancer status post partial resection in 2012, epigastric pain who presents for follow-up  Since last visit he underwent an endoscopy that reportedly showed Varela's esophagus  His colonoscopy was reportedly normal   He took omeprazole with relief of his pain  He does note that at times he has a sour taste in the back of his mouth/in his throat  He had cut back on coffee and has experienced some improvement  Prior CBC with normal white blood cell count, hemoglobin, platelets  Normal iron studies in the past   Prior CMP was normal   Prior TSH elevated but normal free T4  He may have some silent reflux based on the Barretts esophagus and a sour taste in his throat  1  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    2  Epigastric burning sensation    3  Gastroesophageal reflux disease, unspecified whether esophagitis present        No orders of the defined types were placed in this encounter  Schedule colonoscopy follow-up as per Dr Marisela Mann  He was told to repeat the endoscopy in 1 year to follow up in the Varela's  At that time can also determine further surveillance with endoscopy every 3-5 years  Will increase omeprazole back to daily and give a 2 month trial  We discussed avoiding foods that may trigger reflux  Avoid eating 3 hours close to bedtime  Elevate head of bed with wedge  ______________________________________________________________________    SUBJECTIVE:    Shameka Cassidy is a 69 y o  male who presents with complaint of epigastric pain and varela's    He underwent an EGD and colonoscopy  Dr Marisela Mann did an EGD and colonoscopy  He was told he had varela's esophagus  He is on omeprazole 20mg  He weaned off  He has been on every other day   He will wake up with hunger pains, he has a bagel and he feels better  During the day he will not eat again till later  He gets intense hunger pains  If he has a coffee in the morning (bitter and taste) it will linger  He tries to drink water but he still has some sourness  Weight is good  No diarrhea, constipation, blood in the stool or black stools  No nausea, vomiting  REVIEW OF SYSTEMS IS OTHERWISE NEGATIVE  10 point ROS reviewed and negative, except as above      Historical Information   No past medical history on file  Past Surgical History:   Procedure Laterality Date    SUBTOTAL COLECTOMY      TOTAL THYROIDECTOMY       Social History   Social History     Substance and Sexual Activity   Alcohol Use Yes    Comment: Social      Social History     Substance and Sexual Activity   Drug Use Not Currently     Social History     Tobacco Use   Smoking Status Never Smoker   Smokeless Tobacco Never Used     Family History   Problem Relation Age of Onset    Thyroid cancer Brother     Thyroid disease unspecified Brother     Thyroid cancer Family     Thyroid disease unspecified Family     Breast cancer Mother     Lung cancer Mother     Ovarian cancer Mother     Lung cancer Father        Meds/Allergies       Current Outpatient Medications:     omeprazole (PriLOSEC) 20 mg delayed release capsule    ALLOPURINOL PO    atenolol (TENORMIN) 50 mg tablet    b complex vitamins capsule    Bromelains (BROMELAIN PO)    Cholecalciferol 10 MCG (400 UNIT) CAPS    hydrochlorothiazide (HYDRODIURIL) 25 mg tablet    Krill Oil 1000 MG CAPS    levothyroxine 112 mcg tablet    lisinopril (ZESTRIL) 20 mg tablet    Magnesium Oxide 250 MG TABS    Misc Natural Products (PROSTATE SUPPORT PO)    tamsulosin (FLOMAX) 0 4 mg    verapamil (CALAN-SR) 120 mg CR tablet    Zinc 10 MG LOZG    Allergies   Allergen Reactions    Sulfa Antibiotics Hives and Rash           Objective     There were no vitals taken for this visit   There is no height or weight on file to calculate BMI  PHYSICAL EXAMINATION:    General Appearance:   Alert, cooperative, no distress   HEENT:  Normocephalic, atraumatic, anicteric  Neck supple, symmetrical, trachea midline  Lungs:   Equal chest rise and unlabored breathing, normal effort, no coughing  Cardiovascular:   No visualized JVD  Abdomen:   No abdominal distension  Skin:   No jaundice, rashes, or lesions  Musculoskeletal:   Normal range of motion visualized  Psych:  Normal affect and normal insight  Neuro:  Alert and appropriate  Lab Results:   No visits with results within 1 Day(s) from this visit     Latest known visit with results is:   Lab on 01/28/2022   Component Date Value    Free T4 01/28/2022 0 97     TSH 3RD GENERATON 01/28/2022 6 650 (A)    Thyroglobulin Ab 01/28/2022 <1 0     Vit D, 25-Hydroxy 01/28/2022 17 9 (A)    WBC 01/28/2022 9 26     RBC 01/28/2022 5 73 (A)    Hemoglobin 01/28/2022 17 0     Hematocrit 01/28/2022 50 8 (A)    MCV 01/28/2022 89     MCH 01/28/2022 29 7     MCHC 01/28/2022 33 5     RDW 01/28/2022 13 6     MPV 01/28/2022 9 7     Platelets 74/42/3198 243     nRBC 01/28/2022 0     Neutrophils Relative 01/28/2022 47     Immat GRANS % 01/28/2022 0     Lymphocytes Relative 01/28/2022 39     Monocytes Relative 01/28/2022 11     Eosinophils Relative 01/28/2022 2     Basophils Relative 01/28/2022 1     Neutrophils Absolute 01/28/2022 4 33     Immature Grans Absolute 01/28/2022 0 04     Lymphocytes Absolute 01/28/2022 3 63     Monocytes Absolute 01/28/2022 1 01     Eosinophils Absolute 01/28/2022 0 18     Basophils Absolute 01/28/2022 0 07     Iron Saturation 01/28/2022 25     TIBC 01/28/2022 395     Iron 01/28/2022 97     Ferritin 01/28/2022 131     Thyroglobulin-SAUL 01/28/2022 0 3 (A)       Lab Results   Component Value Date    WBC 9 26 01/28/2022    HGB 17 0 01/28/2022    HCT 50 8 (H) 01/28/2022    MCV 89 01/28/2022     01/28/2022 No results found for: NA, SODIUM, K, CL, CO2, ANIONGAP, AGAP, BUN, CREATININE, GLUC, GLUF, CALCIUM, AST, ALT, ALKPHOS, PROT, TP, BILITOT, TBILI, EGFR    No results found for: CRP    Lab Results   Component Value Date    [id number] 6 650 (H) 01/28/2022       Lab Results   Component Value Date    IRON 97 01/28/2022    TIBC 395 01/28/2022    FERRITIN 131 01/28/2022       Radiology Results:   No results found

## 2022-12-23 ENCOUNTER — TELEMEDICINE (OUTPATIENT)
Dept: GASTROENTEROLOGY | Facility: CLINIC | Age: 67
End: 2022-12-23

## 2022-12-23 DIAGNOSIS — K21.9 GASTROESOPHAGEAL REFLUX DISEASE, UNSPECIFIED WHETHER ESOPHAGITIS PRESENT: Primary | ICD-10-CM

## 2022-12-23 DIAGNOSIS — Z85.038 PERSONAL HISTORY OF COLON CANCER: ICD-10-CM

## 2022-12-23 DIAGNOSIS — K22.70 BARRETT'S ESOPHAGUS WITHOUT DYSPLASIA: ICD-10-CM

## 2022-12-23 NOTE — PROGRESS NOTES
Virtual Regular Visit    Verification of patient location:    Patient is located in the following state in which I hold an active license [place]      Assessment/Plan:  Aren Rodrigues is a 55-year-old male with prior stage III colon cancer status post partial resection in 2012, Cho's esophagus, prior epigastric pain, sour taste at times in the back of his mouth/throat who presents for follow-up  Overall he is now feeling much better  No significant issues at this time  Prior CBC with normal hemoglobin, platelets, white blood cell count  Prior iron studies normal   Prior CMP normal   He had an elevated TSH but normal free T4  Problem List Items Addressed This Visit    None    Awaiting prior EGD report and biopsies  Repeat endoscopy in 2023 for evaluation of Cho's, as he was told by his other gastroenterologist   Would then determine surveillance to be every 3 to 5 years  Omeprazole 20mg daily         Reason for visit is   Chief Complaint   Patient presents with   • Virtual Regular Visit        Encounter provider Larry Torres MD    Provider located at [address] [address] [phone]      Recent Visits  No visits were found meeting these conditions  Showing recent visits within past 7 days and meeting all other requirements  Future Appointments  No visits were found meeting these conditions  Showing future appointments within next 150 days and meeting all other requirements       The patient was identified by name and date of birth  Cait Godfrey was informed that this is a telemedicine visit and that the visit is being conducted through the [facility]  He agrees to proceed     My office door was closed  No one else was in the room  He acknowledged consent and understanding of privacy and security of the video platform   The patient has agreed to participate and understands they can discontinue the visit at any time  Patient is aware this is a billable service  Subjective  Pauline Salcido is a 69 y o  male who presents for follow-up  No hunger pains, abdominal noises, nausea  He went to his PCP who reviewed his endoscopy results  He is on omeprazole 20mg daily  The biopsies reportedly looked fine  Otherwise he feels well  No past medical history on file  Past Surgical History:   Procedure Laterality Date   • SUBTOTAL COLECTOMY     • TOTAL THYROIDECTOMY         Current Outpatient Medications   Medication Sig Dispense Refill   • ALLOPURINOL  mg daily      • atenolol (TENORMIN) 50 mg tablet Take 50 mg by mouth 2 (two) times a day       • b complex vitamins capsule Take 1 capsule by mouth daily     • Bromelains (BROMELAIN PO) Take by mouth     • Cholecalciferol 10 MCG (400 UNIT) CAPS Take 5,000 Units by mouth daily     • hydrochlorothiazide (HYDRODIURIL) 25 mg tablet 25 mg daily       • Krill Oil 1000 MG CAPS Taking 1 capsule (400 mg) daily     • levothyroxine 112 mcg tablet Take 1 tablet (112 mcg total) by mouth daily 30 tablet 6   • lisinopril (ZESTRIL) 20 mg tablet Take 40 mg by mouth daily       • Magnesium Oxide 250 MG TABS Take 2 tabs daily as tolerated  • Misc Natural Products (PROSTATE SUPPORT PO) Take by mouth     • omeprazole (PriLOSEC) 20 mg delayed release capsule Take 1 capsule (20 mg total) by mouth daily 30 capsule 5   • tamsulosin (FLOMAX) 0 4 mg Take 0 4 mg by mouth daily      • verapamil (CALAN-SR) 120 mg CR tablet Take 120 mg by mouth daily at bedtime  (Patient not taking: Reported on 1/26/2022 )     • Zinc 10 MG LOZG Apply to the mouth or throat       No current facility-administered medications for this visit          Allergies   Allergen Reactions   • Sulfa Antibiotics Hives and Rash       REVIEW OF SYSTEMS:  10 point ROS reviewed and negative, except as above      PHYSICAL EXAMINATION:  Appearance and vitals taken from home devices    General Appearance:   Alert, cooperative, no distress   HEENT:  Normocephalic, atraumatic, anicteric  Neck supple, symmetrical, trachea midline  Lungs:   Equal chest rise and unlabored breathing, normal effort, no coughing  Cardiovascular:   No visualized JVD  Abdomen:   No abdominal distension  Skin:   No jaundice, rashes, or lesions  Musculoskeletal:   Normal range of motion visualized  Psych:  Normal affect and normal insight  Neuro:  Alert and appropriate            I spent 10 minutes directly with the patient during this visit

## 2022-12-26 NOTE — ASSESSMENT & PLAN NOTE
Reports now history of recurrent cancer-will get a baseline thyroglobulin level    Request prior records You were seen in the ER for cough.  I am treating you for COPD or asthma exacerbation.  I have sent in a prescription for an albuterol inhaler as well as prednisone to your pharmacy, use them as directed.  You should follow-up with your primary care physician this week for recheck.  Return to the ER with new or worsening symptoms.  I hope you feel better soon!

## 2024-03-19 ENCOUNTER — TELEPHONE (OUTPATIENT)
Age: 69
End: 2024-03-19

## 2024-03-19 NOTE — TELEPHONE ENCOUNTER
Patient wife calling to schedule a f/u with Dr. Miller. She will call back after she speak with the  patient

## 2024-03-26 ENCOUNTER — PREP FOR PROCEDURE (OUTPATIENT)
Dept: GASTROENTEROLOGY | Facility: CLINIC | Age: 69
End: 2024-03-26

## 2024-03-26 ENCOUNTER — TELEPHONE (OUTPATIENT)
Dept: GASTROENTEROLOGY | Facility: CLINIC | Age: 69
End: 2024-03-26

## 2024-03-26 DIAGNOSIS — R10.13 EPIGASTRIC BURNING SENSATION: ICD-10-CM

## 2024-03-26 DIAGNOSIS — K22.70 BARRETT'S ESOPHAGUS WITHOUT DYSPLASIA: ICD-10-CM

## 2024-03-26 DIAGNOSIS — K21.9 GASTROESOPHAGEAL REFLUX DISEASE, UNSPECIFIED WHETHER ESOPHAGITIS PRESENT: Primary | ICD-10-CM

## 2024-03-26 DIAGNOSIS — K21.9 GASTROESOPHAGEAL REFLUX DISEASE, UNSPECIFIED WHETHER ESOPHAGITIS PRESENT: ICD-10-CM

## 2024-03-26 RX ORDER — OMEPRAZOLE 20 MG/1
20 CAPSULE, DELAYED RELEASE ORAL DAILY
Qty: 30 CAPSULE | Refills: 5 | Status: SHIPPED | OUTPATIENT
Start: 2024-03-26

## 2024-03-26 NOTE — TELEPHONE ENCOUNTER
Patient's spouse was in the office. Spoke with Dr. Miller about scheduling an EGD with him . Dr. Miller agreed and placed order. I did state to the spouse that her  would have to call back and schedule due to no updated communication sheet. Stated she will have her  call.   Physical Medicine and Rehabilitation    Consult received and records reviewed.  We will proceed with the physiatry evaluation.    LANDEN Melendez  Rehab Referral Coordinator   [phone]

## 2024-04-23 DIAGNOSIS — K22.70 BARRETT'S ESOPHAGUS WITHOUT DYSPLASIA: ICD-10-CM

## 2024-04-23 DIAGNOSIS — K21.9 GASTROESOPHAGEAL REFLUX DISEASE, UNSPECIFIED WHETHER ESOPHAGITIS PRESENT: ICD-10-CM

## 2024-04-23 DIAGNOSIS — R10.13 EPIGASTRIC BURNING SENSATION: ICD-10-CM

## 2024-04-23 RX ORDER — OMEPRAZOLE 20 MG/1
20 CAPSULE, DELAYED RELEASE ORAL DAILY
Qty: 90 CAPSULE | Refills: 1 | Status: SHIPPED | OUTPATIENT
Start: 2024-04-23

## 2024-05-16 ENCOUNTER — OFFICE VISIT (OUTPATIENT)
Dept: UROLOGY | Facility: AMBULATORY SURGERY CENTER | Age: 69
End: 2024-05-16
Payer: MEDICARE

## 2024-05-16 ENCOUNTER — APPOINTMENT (OUTPATIENT)
Dept: LAB | Facility: CLINIC | Age: 69
End: 2024-05-16
Payer: MEDICARE

## 2024-05-16 ENCOUNTER — TELEPHONE (OUTPATIENT)
Age: 69
End: 2024-05-16

## 2024-05-16 VITALS
WEIGHT: 186 LBS | HEIGHT: 70 IN | SYSTOLIC BLOOD PRESSURE: 136 MMHG | HEART RATE: 68 BPM | OXYGEN SATURATION: 95 % | DIASTOLIC BLOOD PRESSURE: 86 MMHG | BODY MASS INDEX: 26.63 KG/M2

## 2024-05-16 DIAGNOSIS — R97.20 ELEVATED PSA: ICD-10-CM

## 2024-05-16 DIAGNOSIS — R97.20 ELEVATED PSA: Primary | ICD-10-CM

## 2024-05-16 DIAGNOSIS — C18.2 MALIGNANT NEOPLASM OF ASCENDING COLON (HCC): ICD-10-CM

## 2024-05-16 LAB
ANION GAP SERPL CALCULATED.3IONS-SCNC: 9 MMOL/L (ref 4–13)
BUN SERPL-MCNC: 15 MG/DL (ref 5–25)
CALCIUM SERPL-MCNC: 8.9 MG/DL (ref 8.4–10.2)
CHLORIDE SERPL-SCNC: 105 MMOL/L (ref 96–108)
CO2 SERPL-SCNC: 27 MMOL/L (ref 21–32)
CREAT SERPL-MCNC: 0.98 MG/DL (ref 0.6–1.3)
GFR SERPL CREATININE-BSD FRML MDRD: 77 ML/MIN/1.73SQ M
GLUCOSE SERPL-MCNC: 96 MG/DL (ref 65–140)
POTASSIUM SERPL-SCNC: 3.9 MMOL/L (ref 3.5–5.3)
PSA SERPL-MCNC: 5.89 NG/ML (ref 0–4)
SODIUM SERPL-SCNC: 141 MMOL/L (ref 135–147)

## 2024-05-16 PROCEDURE — 84153 ASSAY OF PSA TOTAL: CPT

## 2024-05-16 PROCEDURE — 99204 OFFICE O/P NEW MOD 45 MIN: CPT | Performed by: UROLOGY

## 2024-05-16 PROCEDURE — 80048 BASIC METABOLIC PNL TOTAL CA: CPT

## 2024-05-16 PROCEDURE — 36415 COLL VENOUS BLD VENIPUNCTURE: CPT

## 2024-05-16 RX ORDER — CYCLOBENZAPRINE HCL 10 MG
TABLET ORAL
COMMUNITY

## 2024-05-16 RX ORDER — EPINEPHRINE 0.3 MG/.3ML
INJECTION SUBCUTANEOUS
COMMUNITY

## 2024-05-16 RX ORDER — METHYLPREDNISOLONE ACETATE 80 MG/ML
INJECTION, SUSPENSION INTRA-ARTICULAR; INTRALESIONAL; INTRAMUSCULAR; SOFT TISSUE
COMMUNITY
Start: 2024-01-31

## 2024-05-16 RX ORDER — RIBOFLAVIN (VITAMIN B2) 100 MG
TABLET ORAL
COMMUNITY

## 2024-05-16 RX ORDER — ALLOPURINOL 300 MG/1
TABLET ORAL
COMMUNITY
Start: 2024-02-19

## 2024-05-16 RX ORDER — FAMOTIDINE 20 MG/1
TABLET, FILM COATED ORAL
COMMUNITY

## 2024-05-16 RX ORDER — PREDNISONE 20 MG/1
TABLET ORAL
COMMUNITY

## 2024-05-16 NOTE — PROGRESS NOTES
5/16/2024    Terell Teague  1955  [id number]    1. Elevated PSA  Assessment & Plan:  Impression: Elevated PSA, history negative transrectal ultrasound-guided biopsy of the prostate    Plan: I recommend repeating the PSA level.  Assuming the PSA remains elevated the neck step is a multiparametric MRI of the prostate and I would consider performing an MRI fusion biopsy if there is a target lesion as his initial biopsy 2 years ago was a standard transrectal biopsy performed in the office of an outside urologist.  Continue with tamsulosin.  The patient is amenable with this plan.  Orders:  -     PSA Total, Diagnostic; Future; Expected date: 05/16/2024  -     MRI prostate multiparametric wo w contrast; Future; Expected date: 05/30/2024  -     PSA Total, Diagnostic; Future  2. Malignant neoplasm of ascending colon (HCC)       History of Present Illness  68 y.o. male with a history of an elevated PSA.  His most recent PSA is 6.1.  Approximately 2 years ago he states he had a multiparametric MRI of the prostate performed in the [facility].  He then subsequently had an office-based transrectal ultrasound-guided biopsy of the prostate.  Approximately 2 years ago he states he had a multiparametric MRI of the prostate performed in the [facility].  He then subsequently had an office-based transrectal ultrasound-guided biopsy of the prostate.  He states that this biopsy was negative for malignancy.  He denies family history of prostate cancer.  He wishes to establish care with [facility].  He is previously known to Dr. Christiansen who operated on him for colon cancer approximately 10 to 12 years ago.  He also has a distant history of thyroid cancer.  He denies any significant lower urinary tract symptoms on tamsulosin.  He wishes to continue this medication      AUA Symptom Score      Review of Systems   Constitutional: Negative.    HENT: Negative.     Eyes: Negative.    Respiratory: Negative.      Cardiovascular: Negative.    Gastrointestinal: Negative.    Endocrine: Negative.    Genitourinary:         Per HPI   Musculoskeletal: Negative.    Skin: Negative.    Allergic/Immunologic: Negative.    Neurological: Negative.    Hematological: Negative.    Psychiatric/Behavioral: Negative.         Past Medical History  History reviewed. No pertinent past medical history.    Past Social History  Past Surgical History:   Procedure Laterality Date   • SUBTOTAL COLECTOMY     • TOTAL THYROIDECTOMY         Past Family History  Family History   Problem Relation Age of Onset   • Thyroid cancer Brother    • Thyroid disease unspecified Brother    • Thyroid cancer Family    • Thyroid disease unspecified Family    • Breast cancer Mother    • Lung cancer Mother    • Ovarian cancer Mother    • Lung cancer Father        Past Social history  Social History     Socioeconomic History   • Marital status: /Civil Union     Spouse name: Not on file   • Number of children: Not on file   • Years of education: Not on file   • Highest education level: Not on file   Occupational History   • Not on file   Tobacco Use   • Smoking status: Never   • Smokeless tobacco: Never   Substance and Sexual Activity   • Alcohol use: Yes     Comment: Social    • Drug use: Not Currently   • Sexual activity: Not on file   Other Topics Concern   • Not on file   Social History Narrative   • Not on file     Social Determinants of Health     Financial Resource Strain: Not on file   Food Insecurity: Not on file   Transportation Needs: Not on file   Physical Activity: Not on file   Stress: Not on file   Social Connections: Not on file   Intimate Partner Violence: Not on file   Housing Stability: Not on file       Current Medications  Current Outpatient Medications   Medication Sig Dispense Refill   • allopurinol (ZYLOPRIM) 300 mg tablet      • ALLOPURINOL  mg daily      • atenolol (TENORMIN) 50 mg tablet Take 50 mg by mouth 2 (two) times a day       •  "b complex vitamins capsule Take 1 capsule by mouth daily     • Bromelains (BROMELAIN PO) Take by mouth     • Cholecalciferol 10 MCG (400 UNIT) CAPS Take 5,000 Units by mouth daily     • cyclobenzaprine (FLEXERIL) 10 mg tablet TAKE 1 TABLET 3 TIMES A DAY BY ORAL ROUTE AS NEEDED.     • EPINEPHrine (EPIPEN) 0.3 mg/0.3 mL SOAJ INJECT 1 PEN INTRAMUSCULARLY AS NEEDED AS DIRECTED FOR ALLERGIC REACTIONS     • famotidine (PEPCID) 20 mg tablet Take 1 tablet twice a day by oral route as directed for 30 days.     • hydrochlorothiazide (HYDRODIURIL) 25 mg tablet 25 mg daily       • Krill Oil 1000 MG CAPS Taking 1 capsule (400 mg) daily     • levothyroxine 112 mcg tablet Take 1 tablet (112 mcg total) by mouth daily 30 tablet 6   • lisinopril (ZESTRIL) 20 mg tablet Take 40 mg by mouth daily       • Magnesium Oxide 250 MG TABS Take 2 tabs daily as tolerated.     • methylPREDNISolone acetate (DEPO-Medrol) injection Take 1 mL by injection route.     • Misc Natural Products (PROSTATE SUPPORT PO) Take by mouth     • omeprazole (PriLOSEC) 20 mg delayed release capsule TAKE 1 CAPSULE BY MOUTH EVERY DAY 90 capsule 1   • predniSONE 20 mg tablet TAKE 2 TABLETS BY MOUTH EVERY DAY AS DIRECTED FOR 5 DAYS     • Riboflavin (Vitamin B-2) 100 MG TABS TAKE 2 TABS IN AM AND 2 TABS IN PM     • tamsulosin (FLOMAX) 0.4 mg Take 0.4 mg by mouth daily      • Zinc 10 MG LOZG Apply to the mouth or throat       No current facility-administered medications for this visit.       Allergies  Allergies   Allergen Reactions   • Sulfa Antibiotics Hives and Rash       Past Medical History, Social History, Family History, medications and allergies were reviewed.    Vitals  Vitals:    05/16/24 1036   BP: 136/86   BP Location: Left arm   Patient Position: Sitting   Cuff Size: Standard   Pulse: 68   SpO2: 95%   Weight: 84.4 kg (186 lb)   Height: 5' 10\" (1.778 m)       Physical Exam  On examination he is in no acute distress.  His abdomen is soft nontender nondistended.  " " examination reveals normal phallus, scrotum and scrotal contents.  A slight left-sided varicocele is appreciated.  Digital rectal examination reveals a soft benign 40 g prostate without nodularity.  Skin is warm.  Extremities without edema.  Neurologic is grossly intact and nonfocal.  Gait normal.  Affect normal.  Results  No results found for: \"PSA\"  Lab Results   Component Value Date    CALCIUM 8.8 02/01/2024    K 4.2 02/01/2024    CO2 22 02/01/2024     02/01/2024    BUN 21 (H) 02/01/2024    CREATININE 0.9 02/01/2024     Lab Results   Component Value Date    WBC 9.26 01/28/2022    HGB 17.0 01/28/2022    HCT 50.8 (H) 01/28/2022    MCV 89 01/28/2022     01/28/2022       Office Urine Dip  No results found for this or any previous visit (from the past 1 hour(s)).]  "

## 2024-05-16 NOTE — ASSESSMENT & PLAN NOTE
Impression: Elevated PSA, history negative transrectal ultrasound-guided biopsy of the prostate    Plan: I recommend repeating the PSA level.  Assuming the PSA remains elevated the neck step is a multiparametric MRI of the prostate and I would consider performing an MRI fusion biopsy if there is a target lesion as his initial biopsy 2 years ago was a standard transrectal biopsy performed in the office of an outside urologist.  Continue with tamsulosin.  The patient is amenable with this plan.

## 2024-05-16 NOTE — LETTER
May 16, 2024     Ralph Ocampo MD  [address] [facility] [address]    Patient: Terell Teague   YOB: 1955   Date of Visit: 5/16/2024       Dear Dr. Ocampo:    Thank you for referring Terell Teague to me for evaluation. Below are my notes for this consultation.    If you have questions, please do not hesitate to call me. I look forward to following your patient along with you.         Sincerely,        Curt Bruce MD        CC: No Recipients    Curt Bruec MD  5/16/2024 11:23 AM  Sign when Signing Visit  5/16/2024    Terell Teague  1955  [id number]    1. Elevated PSA  Assessment & Plan:  Impression: Elevated PSA, history negative transrectal ultrasound-guided biopsy of the prostate    Plan: I recommend repeating the PSA level.  Assuming the PSA remains elevated the neck step is a multiparametric MRI of the prostate and I would consider performing an MRI fusion biopsy if there is a target lesion as his initial biopsy 2 years ago was a standard transrectal biopsy performed in the office of an outside urologist.  The patient is amenable with this plan.  Orders:  -     PSA Total, Diagnostic; Future; Expected date: 05/16/2024  -     MRI prostate multiparametric wo w contrast; Future; Expected date: 05/30/2024  -     PSA Total, Diagnostic; Future  2. Malignant neoplasm of ascending colon (HCC)       History of Present Illness  68 y.o. male with a history of an elevated PSA.  His most recent PSA is 6.1.  Approximately 2 years ago he states he had a multiparametric MRI of the prostate performed in the [facility].  He then subsequently had an office-based transrectal ultrasound-guided biopsy of the prostate.  Approximately 2 years ago he states he had a multiparametric MRI of the prostate performed in the [facility].  He then subsequently had an office-based transrectal ultrasound-guided biopsy of the prostate.  He states that this  biopsy was negative for malignancy.  He denies family history of prostate cancer.  He wishes to establish care with [facility].  He is previously known to Dr. Christiansen who operated on him for colon cancer approximately 10 to 12 years ago.  He also has a distant history of thyroid cancer.  He denies any significant lower urinary tract symptoms on tamsulosin.  He wishes to continue this medication      AUA Symptom Score      Review of Systems   Constitutional: Negative.    HENT: Negative.     Eyes: Negative.    Respiratory: Negative.     Cardiovascular: Negative.    Gastrointestinal: Negative.    Endocrine: Negative.    Genitourinary:         Per HPI   Musculoskeletal: Negative.    Skin: Negative.    Allergic/Immunologic: Negative.    Neurological: Negative.    Hematological: Negative.    Psychiatric/Behavioral: Negative.         Past Medical History  History reviewed. No pertinent past medical history.    Past Social History  Past Surgical History:   Procedure Laterality Date   • SUBTOTAL COLECTOMY     • TOTAL THYROIDECTOMY         Past Family History  Family History   Problem Relation Age of Onset   • Thyroid cancer Brother    • Thyroid disease unspecified Brother    • Thyroid cancer Family    • Thyroid disease unspecified Family    • Breast cancer Mother    • Lung cancer Mother    • Ovarian cancer Mother    • Lung cancer Father        Past Social history  Social History     Socioeconomic History   • Marital status: /Civil Union     Spouse name: Not on file   • Number of children: Not on file   • Years of education: Not on file   • Highest education level: Not on file   Occupational History   • Not on file   Tobacco Use   • Smoking status: Never   • Smokeless tobacco: Never   Substance and Sexual Activity   • Alcohol use: Yes     Comment: Social    • Drug use: Not Currently   • Sexual activity: Not on file   Other Topics Concern   • Not on file   Social History Narrative   • Not on file     Social Determinants  of Health     Financial Resource Strain: Not on file   Food Insecurity: Not on file   Transportation Needs: Not on file   Physical Activity: Not on file   Stress: Not on file   Social Connections: Not on file   Intimate Partner Violence: Not on file   Housing Stability: Not on file       Current Medications  Current Outpatient Medications   Medication Sig Dispense Refill   • allopurinol (ZYLOPRIM) 300 mg tablet      • ALLOPURINOL  mg daily      • atenolol (TENORMIN) 50 mg tablet Take 50 mg by mouth 2 (two) times a day       • b complex vitamins capsule Take 1 capsule by mouth daily     • Bromelains (BROMELAIN PO) Take by mouth     • Cholecalciferol 10 MCG (400 UNIT) CAPS Take 5,000 Units by mouth daily     • cyclobenzaprine (FLEXERIL) 10 mg tablet TAKE 1 TABLET 3 TIMES A DAY BY ORAL ROUTE AS NEEDED.     • EPINEPHrine (EPIPEN) 0.3 mg/0.3 mL SOAJ INJECT 1 PEN INTRAMUSCULARLY AS NEEDED AS DIRECTED FOR ALLERGIC REACTIONS     • famotidine (PEPCID) 20 mg tablet Take 1 tablet twice a day by oral route as directed for 30 days.     • hydrochlorothiazide (HYDRODIURIL) 25 mg tablet 25 mg daily       • Krill Oil 1000 MG CAPS Taking 1 capsule (400 mg) daily     • levothyroxine 112 mcg tablet Take 1 tablet (112 mcg total) by mouth daily 30 tablet 6   • lisinopril (ZESTRIL) 20 mg tablet Take 40 mg by mouth daily       • Magnesium Oxide 250 MG TABS Take 2 tabs daily as tolerated.     • methylPREDNISolone acetate (DEPO-Medrol) injection Take 1 mL by injection route.     • Misc Natural Products (PROSTATE SUPPORT PO) Take by mouth     • omeprazole (PriLOSEC) 20 mg delayed release capsule TAKE 1 CAPSULE BY MOUTH EVERY DAY 90 capsule 1   • predniSONE 20 mg tablet TAKE 2 TABLETS BY MOUTH EVERY DAY AS DIRECTED FOR 5 DAYS     • Riboflavin (Vitamin B-2) 100 MG TABS TAKE 2 TABS IN AM AND 2 TABS IN PM     • tamsulosin (FLOMAX) 0.4 mg Take 0.4 mg by mouth daily      • Zinc 10 MG LOZG Apply to the mouth or throat       No current  "facility-administered medications for this visit.       Allergies  Allergies   Allergen Reactions   • Sulfa Antibiotics Hives and Rash       Past Medical History, Social History, Family History, medications and allergies were reviewed.    Vitals  Vitals:    05/16/24 1036   BP: 136/86   BP Location: Left arm   Patient Position: Sitting   Cuff Size: Standard   Pulse: 68   SpO2: 95%   Weight: 84.4 kg (186 lb)   Height: 5' 10\" (1.778 m)       Physical Exam  On examination he is in no acute distress.  His abdomen is soft nontender nondistended.   examination reveals normal phallus, scrotum and scrotal contents.  A slight left-sided varicocele is appreciated.  Digital rectal examination reveals a soft benign 40 g prostate without nodularity.  Skin is warm.  Extremities without edema.  Neurologic is grossly intact and nonfocal.  Gait normal.  Affect normal.  Results  No results found for: \"PSA\"  Lab Results   Component Value Date    CALCIUM 8.8 02/01/2024    K 4.2 02/01/2024    CO2 22 02/01/2024     02/01/2024    BUN 21 (H) 02/01/2024    CREATININE 0.9 02/01/2024     Lab Results   Component Value Date    WBC 9.26 01/28/2022    HGB 17.0 01/28/2022    HCT 50.8 (H) 01/28/2022    MCV 89 01/28/2022     01/28/2022       Office Urine Dip  No results found for this or any previous visit (from the past 1 hour(s)).]    "

## 2024-05-16 NOTE — TELEPHONE ENCOUNTER
Confirming pt is only to have 1 PSA done today and the second order is for the future.    Confirmed with office that is correct.

## 2024-05-16 NOTE — LETTER
May 16, 2024     Ralph Ocampo MD  [address] [facility] [address]    Patient: Terell Teague   YOB: 1955   Date of Visit: 5/16/2024       Dear Dr. Ocampo:    Thank you for referring Terell Teague to me for evaluation. Below are my notes for this consultation.    If you have questions, please do not hesitate to call me. I look forward to following your patient along with you.         Sincerely,        Curt Bruce MD        CC: No Recipients    Curt Bruce MD  5/16/2024 11:23 AM  Sign when Signing Visit  5/16/2024    Terell Teague  1955  [id number]    1. Elevated PSA  Assessment & Plan:  Impression: Elevated PSA, history negative transrectal ultrasound-guided biopsy of the prostate    Plan: I recommend repeating the PSA level.  Assuming the PSA remains elevated the neck step is a multiparametric MRI of the prostate and I would consider performing an MRI fusion biopsy if there is a target lesion as his initial biopsy 2 years ago was a standard transrectal biopsy performed in the office of an outside urologist.  The patient is amenable with this plan.  Orders:  -     PSA Total, Diagnostic; Future; Expected date: 05/16/2024  -     MRI prostate multiparametric wo w contrast; Future; Expected date: 05/30/2024  -     PSA Total, Diagnostic; Future  2. Malignant neoplasm of ascending colon (HCC)       History of Present Illness  68 y.o. male with a history of an elevated PSA.  His most recent PSA is 6.1.  Approximately 2 years ago he states he had a multiparametric MRI of the prostate performed in the [facility].  He then subsequently had an office-based transrectal ultrasound-guided biopsy of the prostate.  Approximately 2 years ago he states he had a multiparametric MRI of the prostate performed in the [facility].  He then subsequently had an office-based transrectal ultrasound-guided biopsy of the prostate.  He states that this  biopsy was negative for malignancy.  He denies family history of prostate cancer.  He wishes to establish care with [facility].  He is previously known to Dr. Christiansen who operated on him for colon cancer approximately 10 to 12 years ago.  He also has a distant history of thyroid cancer.  He denies any significant lower urinary tract symptoms on tamsulosin.  He wishes to continue this medication      AUA Symptom Score      Review of Systems   Constitutional: Negative.    HENT: Negative.     Eyes: Negative.    Respiratory: Negative.     Cardiovascular: Negative.    Gastrointestinal: Negative.    Endocrine: Negative.    Genitourinary:         Per HPI   Musculoskeletal: Negative.    Skin: Negative.    Allergic/Immunologic: Negative.    Neurological: Negative.    Hematological: Negative.    Psychiatric/Behavioral: Negative.         Past Medical History  History reviewed. No pertinent past medical history.    Past Social History  Past Surgical History:   Procedure Laterality Date   • SUBTOTAL COLECTOMY     • TOTAL THYROIDECTOMY         Past Family History  Family History   Problem Relation Age of Onset   • Thyroid cancer Brother    • Thyroid disease unspecified Brother    • Thyroid cancer Family    • Thyroid disease unspecified Family    • Breast cancer Mother    • Lung cancer Mother    • Ovarian cancer Mother    • Lung cancer Father        Past Social history  Social History     Socioeconomic History   • Marital status: /Civil Union     Spouse name: Not on file   • Number of children: Not on file   • Years of education: Not on file   • Highest education level: Not on file   Occupational History   • Not on file   Tobacco Use   • Smoking status: Never   • Smokeless tobacco: Never   Substance and Sexual Activity   • Alcohol use: Yes     Comment: Social    • Drug use: Not Currently   • Sexual activity: Not on file   Other Topics Concern   • Not on file   Social History Narrative   • Not on file     Social Determinants  of Health     Financial Resource Strain: Not on file   Food Insecurity: Not on file   Transportation Needs: Not on file   Physical Activity: Not on file   Stress: Not on file   Social Connections: Not on file   Intimate Partner Violence: Not on file   Housing Stability: Not on file       Current Medications  Current Outpatient Medications   Medication Sig Dispense Refill   • allopurinol (ZYLOPRIM) 300 mg tablet      • ALLOPURINOL  mg daily      • atenolol (TENORMIN) 50 mg tablet Take 50 mg by mouth 2 (two) times a day       • b complex vitamins capsule Take 1 capsule by mouth daily     • Bromelains (BROMELAIN PO) Take by mouth     • Cholecalciferol 10 MCG (400 UNIT) CAPS Take 5,000 Units by mouth daily     • cyclobenzaprine (FLEXERIL) 10 mg tablet TAKE 1 TABLET 3 TIMES A DAY BY ORAL ROUTE AS NEEDED.     • EPINEPHrine (EPIPEN) 0.3 mg/0.3 mL SOAJ INJECT 1 PEN INTRAMUSCULARLY AS NEEDED AS DIRECTED FOR ALLERGIC REACTIONS     • famotidine (PEPCID) 20 mg tablet Take 1 tablet twice a day by oral route as directed for 30 days.     • hydrochlorothiazide (HYDRODIURIL) 25 mg tablet 25 mg daily       • Krill Oil 1000 MG CAPS Taking 1 capsule (400 mg) daily     • levothyroxine 112 mcg tablet Take 1 tablet (112 mcg total) by mouth daily 30 tablet 6   • lisinopril (ZESTRIL) 20 mg tablet Take 40 mg by mouth daily       • Magnesium Oxide 250 MG TABS Take 2 tabs daily as tolerated.     • methylPREDNISolone acetate (DEPO-Medrol) injection Take 1 mL by injection route.     • Misc Natural Products (PROSTATE SUPPORT PO) Take by mouth     • omeprazole (PriLOSEC) 20 mg delayed release capsule TAKE 1 CAPSULE BY MOUTH EVERY DAY 90 capsule 1   • predniSONE 20 mg tablet TAKE 2 TABLETS BY MOUTH EVERY DAY AS DIRECTED FOR 5 DAYS     • Riboflavin (Vitamin B-2) 100 MG TABS TAKE 2 TABS IN AM AND 2 TABS IN PM     • tamsulosin (FLOMAX) 0.4 mg Take 0.4 mg by mouth daily      • Zinc 10 MG LOZG Apply to the mouth or throat       No current  "facility-administered medications for this visit.       Allergies  Allergies   Allergen Reactions   • Sulfa Antibiotics Hives and Rash       Past Medical History, Social History, Family History, medications and allergies were reviewed.    Vitals  Vitals:    05/16/24 1036   BP: 136/86   BP Location: Left arm   Patient Position: Sitting   Cuff Size: Standard   Pulse: 68   SpO2: 95%   Weight: 84.4 kg (186 lb)   Height: 5' 10\" (1.778 m)       Physical Exam  On examination he is in no acute distress.  His abdomen is soft nontender nondistended.   examination reveals normal phallus, scrotum and scrotal contents.  A slight left-sided varicocele is appreciated.  Digital rectal examination reveals a soft benign 40 g prostate without nodularity.  Skin is warm.  Extremities without edema.  Neurologic is grossly intact and nonfocal.  Gait normal.  Affect normal.  Results  No results found for: \"PSA\"  Lab Results   Component Value Date    CALCIUM 8.8 02/01/2024    K 4.2 02/01/2024    CO2 22 02/01/2024     02/01/2024    BUN 21 (H) 02/01/2024    CREATININE 0.9 02/01/2024     Lab Results   Component Value Date    WBC 9.26 01/28/2022    HGB 17.0 01/28/2022    HCT 50.8 (H) 01/28/2022    MCV 89 01/28/2022     01/28/2022       Office Urine Dip  No results found for this or any previous visit (from the past 1 hour(s)).]    "

## 2024-06-13 ENCOUNTER — APPOINTMENT (OUTPATIENT)
Dept: LAB | Facility: CLINIC | Age: 69
End: 2024-06-13
Payer: MEDICARE

## 2024-06-13 DIAGNOSIS — R97.20 ELEVATED PSA: ICD-10-CM

## 2024-06-13 LAB
ANION GAP SERPL CALCULATED.3IONS-SCNC: 4 MMOL/L (ref 4–13)
BUN SERPL-MCNC: 14 MG/DL (ref 5–25)
CALCIUM SERPL-MCNC: 9 MG/DL (ref 8.4–10.2)
CHLORIDE SERPL-SCNC: 106 MMOL/L (ref 96–108)
CO2 SERPL-SCNC: 28 MMOL/L (ref 21–32)
CREAT SERPL-MCNC: 0.91 MG/DL (ref 0.6–1.3)
GFR SERPL CREATININE-BSD FRML MDRD: 85 ML/MIN/1.73SQ M
GLUCOSE P FAST SERPL-MCNC: 99 MG/DL (ref 65–99)
POTASSIUM SERPL-SCNC: 4.3 MMOL/L (ref 3.5–5.3)
PSA SERPL-MCNC: 5.77 NG/ML (ref 0–4)
SODIUM SERPL-SCNC: 138 MMOL/L (ref 135–147)

## 2024-06-13 PROCEDURE — 84153 ASSAY OF PSA TOTAL: CPT

## 2024-06-13 PROCEDURE — 80048 BASIC METABOLIC PNL TOTAL CA: CPT

## 2024-06-24 ENCOUNTER — HOSPITAL ENCOUNTER (OUTPATIENT)
Dept: RADIOLOGY | Age: 69
Discharge: HOME | End: 2024-06-24
Payer: MEDICARE

## 2024-06-24 DIAGNOSIS — R97.20 ELEVATED PSA: ICD-10-CM

## 2024-06-24 PROCEDURE — 76377 3D RENDER W/INTRP POSTPROCES: CPT

## 2024-06-24 PROCEDURE — A9585 GADOBUTROL INJECTION: HCPCS | Performed by: UROLOGY

## 2024-06-24 PROCEDURE — 72197 MRI PELVIS W/O & W/DYE: CPT

## 2024-06-24 RX ORDER — GADOBUTROL 604.72 MG/ML
8 INJECTION INTRAVENOUS
Status: COMPLETED | OUTPATIENT
Start: 2024-06-24 | End: 2024-06-24

## 2024-06-24 RX ADMIN — GADOBUTROL 8 ML: 604.72 INJECTION INTRAVENOUS at 11:32

## 2024-06-26 ENCOUNTER — NURSE TRIAGE (OUTPATIENT)
Age: 69
End: 2024-06-26

## 2024-07-01 ENCOUNTER — TELEPHONE (OUTPATIENT)
Dept: UROLOGY | Facility: AMBULATORY SURGERY CENTER | Age: 69
End: 2024-07-01

## 2024-07-01 NOTE — TELEPHONE ENCOUNTER
I called Gokul and gave him the results of his MRI of the prostate over the phone.  I explained to him that he has 1 lesion that is a PI-RADS 4.  I discussed with him that an MRI is not diagnostic of prostate cancer and the only way to rule out prostate cancer is by a biopsy.  It is recommended that he proceed with an MRI fusion biopsy.  I explained to him that our surgery scheduler will be reaching out to him regarding instructions for the biopsy.  I also explained to him that he will follow-up with Dr. Bruce in the office for pathology review approximately 2 weeks after the biopsy.  He is understanding of this and all questions and concerns were answered on the telephone call.

## 2024-07-09 PROBLEM — L98.9 SKIN LESION: Status: ACTIVE | Noted: 2024-07-09

## 2024-07-10 ENCOUNTER — TELEPHONE (OUTPATIENT)
Dept: UROLOGY | Facility: AMBULATORY SURGERY CENTER | Age: 69
End: 2024-07-10

## 2024-07-10 NOTE — TELEPHONE ENCOUNTER
Called and spoke with patient. Nanda already reviewed results over the phone and is waiting for a call to set up MRI fusion bx. Patient still interested in meeting with Dr. Bruce. Scheduled 7/12 in [place]. Confirmed office location with patient.

## 2024-07-10 NOTE — TELEPHONE ENCOUNTER
----- Message from Curt Bruce MD sent at 7/8/2024 10:11 PM EDT -----  Yes 7/11 with me please.  FT  ----- Message -----  From: Sachi Hendricks RN  Sent: 7/2/2024  11:00 AM EDT  To: Kristen Stewart, RAE; Eran Christiansen MD; #    Patient has appt with Nanda on 7/15 per your last note. Can add to 7/11 with you if you want to see patient. Just confirming it is just the MRI results?  ----- Message -----  From: Curt Bruce MD  Sent: 7/2/2024  10:29 AM EDT  To: RAE Espinoza; Eran Christiansen MD; #    Sachi can you fast track Gokul to see me.  Thank you.    FT  ----- Message -----  From: Eran Christiansen MD  Sent: 6/28/2024   6:34 PM EDT  To: RAE Espinoza; MD Curt RodriguezVishal is Kristen Stewart's brother in law. Pt of mine in the past as well. Can you look at the MRI of his prostate. Set up to see one of your PA's. Does he just need a bx or see you instead.  Thanks,  Eran

## 2024-07-11 ENCOUNTER — CONSULT (OUTPATIENT)
Dept: SURGICAL ONCOLOGY | Facility: CLINIC | Age: 69
End: 2024-07-11
Payer: MEDICARE

## 2024-07-11 VITALS
TEMPERATURE: 97.5 F | DIASTOLIC BLOOD PRESSURE: 72 MMHG | WEIGHT: 191 LBS | RESPIRATION RATE: 18 BRPM | HEIGHT: 70 IN | SYSTOLIC BLOOD PRESSURE: 124 MMHG | BODY MASS INDEX: 27.35 KG/M2 | OXYGEN SATURATION: 97 % | HEART RATE: 67 BPM

## 2024-07-11 DIAGNOSIS — L98.9 SKIN LESION: Primary | ICD-10-CM

## 2024-07-11 PROCEDURE — 11104 PUNCH BX SKIN SINGLE LESION: CPT | Performed by: SURGERY

## 2024-07-11 PROCEDURE — 99202 OFFICE O/P NEW SF 15 MIN: CPT | Performed by: SURGERY

## 2024-07-11 PROCEDURE — 88305 TISSUE EXAM BY PATHOLOGIST: CPT | Performed by: PATHOLOGY

## 2024-07-11 NOTE — LETTER
July 11, 2024     Ralph Ocampo MD  [address] [facility] [address]    Patient: Terell Teague   YOB: 1955   Date of Visit: 7/11/2024       Dear Dr. Ocampo:    Thank you for referring Terell Teague to me for evaluation. Below are my notes for this consultation.    If you have questions, please do not hesitate to call me. I look forward to following your patient along with you.         Sincerely,        Eran Christiansen MD        CC: No Recipients    Eran Christiansen MD  7/11/2024 10:39 AM  Sign when Signing Visit               Surgical Oncology Consult       [facility] [address] [address]  [phone]    Terell Teague  1955  [id number]  [facility] [place] [address] [facility] [address]  [phone]    1. Skin lesion  Assessment & Plan:  68-year-old male with a benign-appearing lesion on his back.  This is likely a pigmented seborrheic keratosis.  I have told him just based on the size of this being nearly 2 cm in size to have this excised.  Unfortunately, we could not do this today since the procedure room was already being utilized.  I did recommend a punch biopsy just to confirm that this was benign.  The risks were explained including bleeding and infection.  He tolerated this well.  He was instructed on signs and symptoms of infection.  We will call him with the results.  I did  him to follow-up with dermatology.  All of his questions were answered.      Chief Complaint   Patient presents with   • Consult       No follow-ups on file.    Oncology History    No history exists.       History of Present Illness: Vishal returns because his family noticed a pigmented lesion on his back.  He thinks he has had this for many years.  It is not itching or bleeding.    Review of Systems  Complete ROS  Surg Onc:   Constitutional: The patient denies new or recent history of general fatigue, no recent weight loss, no change in appetite.   Eyes: No complaints of visual problems, no scleral icterus.   ENT: no complaints of ear pain, no hoarseness, no difficulty swallowing,  no tinnitus and no new masses in head, oral cavity, or neck.   Cardiovascular: No complaints of chest pain, no palpitations, no ankle edema.   Respiratory: No complaints of shortness of breath, no cough.   Gastrointestinal: No complaints of jaundice, no bloody stools, no pale stools.   Genitourinary: No complaints of dysuria, no hematuria, no nocturia, no frequent urination, no urethral discharge.   Musculoskeletal: No complaints of weakness, paralysis, joint stiffness or arthralgias.  Integumentary: No complaints of rash, no new lesions.   Neurological: No complaints of convulsions, no seizures, no dizziness.   Hematologic/Lymphatic: No complaints of easy bruising.   Endocrine:  No hot or cold intolerance.  No polydipsia, polyphagia, or polyuria.  Allergy/immunology:  No environmental allergies.  No food allergies.  Not immunocompromised.  Skin:  No pallor or rash.  No wound.          Patient Active Problem List   Diagnosis   • Hypertension   • Hypothyroidism   • Renal colic   • Thyroid cancer (HCC)   • Low TSH level   • Fatigue   • Personal history of colon cancer, stage III   • Elevated PSA   • Skin lesion     History reviewed. No pertinent past medical history.  Past Surgical History:   Procedure Laterality Date   • SUBTOTAL COLECTOMY     • TOTAL THYROIDECTOMY       Family History   Problem Relation Age of Onset   • Thyroid cancer Brother    • Thyroid disease unspecified Brother    • Thyroid cancer Family    • Thyroid disease unspecified Family    • Breast cancer Mother    • Lung cancer Mother    • Ovarian cancer Mother    • Lung cancer Father      Social History     Socioeconomic History   • Marital status: /Civil Union     Spouse name:  Not on file   • Number of children: Not on file   • Years of education: Not on file   • Highest education level: Not on file   Occupational History   • Not on file   Tobacco Use   • Smoking status: Never   • Smokeless tobacco: Never   Substance and Sexual Activity   • Alcohol use: Yes     Comment: Social    • Drug use: Not Currently   • Sexual activity: Not on file   Other Topics Concern   • Not on file   Social History Narrative   • Not on file     Social Determinants of Health     Financial Resource Strain: Not on file   Food Insecurity: Not on file   Transportation Needs: Not on file   Physical Activity: Not on file   Stress: Not on file   Social Connections: Unknown (6/18/2024)    Received from [employer]    Social [employer]    • How often do you feel lonely or isolated from those around you? (Adult - for ages 18 years and over): Not on file   Intimate Partner Violence: Not on file   Housing Stability: Not on file       Current Outpatient Medications:   •  allopurinol (ZYLOPRIM) 300 mg tablet, , Disp: , Rfl:   •  ALLOPURINOL PO, 300 mg daily , Disp: , Rfl:   •  atenolol (TENORMIN) 50 mg tablet, Take 50 mg by mouth 2 (two) times a day  , Disp: , Rfl:   •  b complex vitamins capsule, Take 1 capsule by mouth daily, Disp: , Rfl:   •  Bromelains (BROMELAIN PO), Take by mouth, Disp: , Rfl:   •  Cholecalciferol 10 MCG (400 UNIT) CAPS, Take 5,000 Units by mouth daily, Disp: , Rfl:   •  cyclobenzaprine (FLEXERIL) 10 mg tablet, TAKE 1 TABLET 3 TIMES A DAY BY ORAL ROUTE AS NEEDED., Disp: , Rfl:   •  EPINEPHrine (EPIPEN) 0.3 mg/0.3 mL SOAJ, INJECT 1 PEN INTRAMUSCULARLY AS NEEDED AS DIRECTED FOR ALLERGIC REACTIONS, Disp: , Rfl:   •  famotidine (PEPCID) 20 mg tablet, Take 1 tablet twice a day by oral route as directed for 30 days., Disp: , Rfl:   •  hydrochlorothiazide (HYDRODIURIL) 25 mg tablet, 25 mg daily  , Disp: , Rfl:   •  Krill Oil 1000 MG CAPS, Taking 1 capsule (400 mg) daily, Disp: , Rfl:   •  levothyroxine 112 mcg  tablet, Take 1 tablet (112 mcg total) by mouth daily, Disp: 30 tablet, Rfl: 6  •  lisinopril (ZESTRIL) 20 mg tablet, Take 40 mg by mouth daily  , Disp: , Rfl:   •  Magnesium Oxide 250 MG TABS, Take 2 tabs daily as tolerated., Disp: , Rfl:   •  methylPREDNISolone acetate (DEPO-Medrol) injection, Take 1 mL by injection route., Disp: , Rfl:   •  Misc Natural Products (PROSTATE SUPPORT PO), Take by mouth, Disp: , Rfl:   •  omeprazole (PriLOSEC) 20 mg delayed release capsule, TAKE 1 CAPSULE BY MOUTH EVERY DAY, Disp: 90 capsule, Rfl: 1  •  predniSONE 20 mg tablet, TAKE 2 TABLETS BY MOUTH EVERY DAY AS DIRECTED FOR 5 DAYS, Disp: , Rfl:   •  Riboflavin (Vitamin B-2) 100 MG TABS, TAKE 2 TABS IN AM AND 2 TABS IN PM, Disp: , Rfl:   •  tamsulosin (FLOMAX) 0.4 mg, Take 0.4 mg by mouth daily , Disp: , Rfl:   •  Zinc 10 MG LOZG, Apply to the mouth or throat, Disp: , Rfl:   Allergies   Allergen Reactions   • Sulfa Antibiotics Hives and Rash     Vitals:    07/11/24 1004   BP: 124/72   Pulse: 67   Resp: 18   Temp: 97.5 °F (36.4 °C)   SpO2: 97%       Physical Exam   Constitutional: General appearance: The Patient is well-developed and well-nourished who appears the stated age in no acute distress. Patient is pleasant and talkative.     HEENT:  Normocephalic.  Sclerae are anicteric. Mucous membranes are moist. Neck is supple without adenopathy. No JVD.     Abdomen: Abdomen is soft, non-tender, non-distended and without masses.     Extremities: There is no clubbing or cyanosis. There is no edema.  Symmetric.  Neuro: Grossly nonfocal. Gait is normal.     Lymphatic: No evidence of cervical adenopathy bilaterally.   No evidence of axillary adenopathy bilaterally.   Skin: Warm, anicteric.    Psych:  Patient is pleasant and talkative.  Breasts:      Pathology:  [unfilled]    Labs:      Imaging  MRI prostate multiparametric wo w contrast    Result Date: 6/28/2024  Narrative: MULTIPARAMETRIC MRI OF THE PROSTATE WITH AND WITHOUT CONTRAST-WITH  3-D POSTPROCESSING INDICATION: R97.20: Elevated prostate specific antigen (PSA). Remote history of colon cancer and thyroid cancer. PSA LEVEL: 5.772 ng/mL on 6/13/2024. PRIOR BIOPSY DATE: Approximately 2 years ago in 2022. BIOPSY RESULTS: Negative. COMPARISON: CT abdomen pelvis 5/27/2014. TECHNIQUE: Multiparametric MRI of the prostate was performed with and without contrast  on a 3T MRI. CONTRAST: Gadobutrol (Gadavist) 8 mL of Gadobutrol injection (SINGLE-DOSE) TECHNICAL LIMITATIONS: None. FINDINGS: PROSTATE: Size: 3.8 x 5.0 x 5.0 cm = 49.7 mL. PSAD= 0.116 ng/mL2 Hemorrhage: None. Benign prostatic hyperplasia (BPH): Moderate. Focal lesions as follows: Lesion: 1 Size: 0.6 x 0.5 x 0.6 cm, series 350 image 9, series 2 image 21, series 4 image 11. Location: Right base posterolateral peripheral zone. T2-weighted images: Score 4: Circumscribed, homogeneous moderate hypointense focus/mass confined to prostate and less than 1.5 cm in greatest dimension. Diffusion-weighted images: Score 4: Focal markedly hypointense on ADC and markedly hyperintense on high b-value DWI; less than 1.5 cm in greatest dimension. Dynamic post-contrast images: (+) Focal, earlier or contemporaneous with, enhancement of adjacent normal prostatic tissues; corresponds to a finding on T2-weighted and/or DWI. PI-RADS Assessment Category: 4, High (clinically significant cancer is likely to be present). Extra-prostatic extension (EPE): Abuts capsule without visualized EPE. Note: Clinically significant cancer is defined on pathology/histology as Heriberto score greater than or equal to 7, and/or volume of greater than or equal to 0.5 mL, and/or extraprostatic extension. SEMINAL VESICLES : Unremarkable. URINARY BLADDER: A 2.5 cm star-shaped jackstone calculus in the bladder. Circumferential bladder wall thickening, which may be due to partial distention of the urinary bladder and/or chronic bladder outlet obstruction. LYMPH NODES: No pelvic lymphadenopathy.  BONES: No suspicious osseous lesion. Stable 5 mm sclerotic lesion in the left iliac wing since 2012, consistent with a bone island.     Impression: 1. PI-RADSv2.1 Category 4 - High (clinically significant cancer is likely to be present). A 0.6 cm lesion in right base posterolateral peripheral zone. 2. No extraprostatic tumor, seminal vesicle invasion, pelvic lymphadenopathy, or pelvic osseous metastatic disease. 3. Calculated prostate volume of 49.7 mL. 4. A 2.5 cm jackstone calculus in the bladder. Prostate gland boundaries and areas of concern for significant prostate cancer were segmented using 3D advanced post-processing on an independent [employer] system workstation with active physician participation. The segmentation was performed should MR-ultrasound fusion biopsy be required. The study was marked in EPIC for significant notification. Workstation performed: [id number]     I personally reviewed and interpreted the above laboratory and imaging data.    Procedure:  Under sterile conditions and local anesthesia, a 4 mm punch biopsy was performed without difficulty.  He tolerated this well.

## 2024-07-11 NOTE — ASSESSMENT & PLAN NOTE
68-year-old male with a benign-appearing lesion on his back.  This is likely a pigmented seborrheic keratosis.  I have told him just based on the size of this being nearly 2 cm in size to have this excised.  Unfortunately, we could not do this today since the procedure room was already being utilized.  I did recommend a punch biopsy just to confirm that this was benign.  The risks were explained including bleeding and infection.  He tolerated this well.  He was instructed on signs and symptoms of infection.  We will call him with the results.  I did  him to follow-up with dermatology.  All of his questions were answered.

## 2024-07-11 NOTE — PROGRESS NOTES
Surgical Oncology Consult       [facility] [place]  [address] [facility] [address]  [phone]    Terell Teague  1955  [id number]  [facility] [place]  [address] [facility] [address]  [phone]    1. Skin lesion  Assessment & Plan:  68-year-old male with a benign-appearing lesion on his back.  This is likely a pigmented seborrheic keratosis.  I have told him just based on the size of this being nearly 2 cm in size to have this excised.  Unfortunately, we could not do this today since the procedure room was already being utilized.  I did recommend a punch biopsy just to confirm that this was benign.  The risks were explained including bleeding and infection.  He tolerated this well.  He was instructed on signs and symptoms of infection.  We will call him with the results.  I did  him to follow-up with dermatology.  All of his questions were answered.      Chief Complaint   Patient presents with    Consult       No follow-ups on file.    Oncology History    No history exists.       History of Present Illness: Vishal returns because his family noticed a pigmented lesion on his back.  He thinks he has had this for many years.  It is not itching or bleeding.    Review of Systems  Complete ROS Surg Onc:   Constitutional: The patient denies new or recent history of general fatigue, no recent weight loss, no change in appetite.   Eyes: No complaints of visual problems, no scleral icterus.   ENT: no complaints of ear pain, no hoarseness, no difficulty swallowing,  no tinnitus and no new masses in head, oral cavity, or neck.   Cardiovascular: No complaints of chest pain, no palpitations, no ankle edema.   Respiratory: No complaints of shortness of breath, no cough.   Gastrointestinal: No complaints of jaundice, no bloody stools, no pale stools.    Genitourinary: No complaints of dysuria, no hematuria, no nocturia, no frequent urination, no urethral discharge.   Musculoskeletal: No complaints of weakness, paralysis, joint stiffness or arthralgias.  Integumentary: No complaints of rash, no new lesions.   Neurological: No complaints of convulsions, no seizures, no dizziness.   Hematologic/Lymphatic: No complaints of easy bruising.   Endocrine:  No hot or cold intolerance.  No polydipsia, polyphagia, or polyuria.  Allergy/immunology:  No environmental allergies.  No food allergies.  Not immunocompromised.  Skin:  No pallor or rash.  No wound.          Patient Active Problem List   Diagnosis    Hypertension    Hypothyroidism    Renal colic    Thyroid cancer (HCC)    Low TSH level    Fatigue    Personal history of colon cancer, stage III    Elevated PSA    Skin lesion     History reviewed. No pertinent past medical history.  Past Surgical History:   Procedure Laterality Date    SUBTOTAL COLECTOMY      TOTAL THYROIDECTOMY       Family History   Problem Relation Age of Onset    Thyroid cancer Brother     Thyroid disease unspecified Brother     Thyroid cancer Family     Thyroid disease unspecified Family     Breast cancer Mother     Lung cancer Mother     Ovarian cancer Mother     Lung cancer Father      Social History     Socioeconomic History    Marital status: /Civil Union     Spouse name: Not on file    Number of children: Not on file    Years of education: Not on file    Highest education level: Not on file   Occupational History    Not on file   Tobacco Use    Smoking status: Never    Smokeless tobacco: Never   Substance and Sexual Activity    Alcohol use: Yes     Comment: Social     Drug use: Not Currently    Sexual activity: Not on file   Other Topics Concern    Not on file   Social History Narrative    Not on file     Social Determinants of Health     Financial Resource Strain: Not on file   Food Insecurity: Not on file   Transportation Needs: Not on  file   Physical Activity: Not on file   Stress: Not on file   Social Connections: Unknown (6/18/2024)    Received from [employer]     How often do you feel lonely or isolated from those around you? (Adult - for ages 18 years and over): Not on file   Intimate Partner Violence: Not on file   Housing Stability: Not on file       Current Outpatient Medications:     allopurinol (ZYLOPRIM) 300 mg tablet, , Disp: , Rfl:     ALLOPURINOL PO, 300 mg daily , Disp: , Rfl:     atenolol (TENORMIN) 50 mg tablet, Take 50 mg by mouth 2 (two) times a day  , Disp: , Rfl:     b complex vitamins capsule, Take 1 capsule by mouth daily, Disp: , Rfl:     Bromelains (BROMELAIN PO), Take by mouth, Disp: , Rfl:     Cholecalciferol 10 MCG (400 UNIT) CAPS, Take 5,000 Units by mouth daily, Disp: , Rfl:     cyclobenzaprine (FLEXERIL) 10 mg tablet, TAKE 1 TABLET 3 TIMES A DAY BY ORAL ROUTE AS NEEDED., Disp: , Rfl:     EPINEPHrine (EPIPEN) 0.3 mg/0.3 mL SOAJ, INJECT 1 PEN INTRAMUSCULARLY AS NEEDED AS DIRECTED FOR ALLERGIC REACTIONS, Disp: , Rfl:     famotidine (PEPCID) 20 mg tablet, Take 1 tablet twice a day by oral route as directed for 30 days., Disp: , Rfl:     hydrochlorothiazide (HYDRODIURIL) 25 mg tablet, 25 mg daily  , Disp: , Rfl:     Krill Oil 1000 MG CAPS, Taking 1 capsule (400 mg) daily, Disp: , Rfl:     levothyroxine 112 mcg tablet, Take 1 tablet (112 mcg total) by mouth daily, Disp: 30 tablet, Rfl: 6    lisinopril (ZESTRIL) 20 mg tablet, Take 40 mg by mouth daily  , Disp: , Rfl:     Magnesium Oxide 250 MG TABS, Take 2 tabs daily as tolerated., Disp: , Rfl:     methylPREDNISolone acetate (DEPO-Medrol) injection, Take 1 mL by injection route., Disp: , Rfl:     Misc Natural Products (PROSTATE SUPPORT PO), Take by mouth, Disp: , Rfl:     omeprazole (PriLOSEC) 20 mg delayed release capsule, TAKE 1 CAPSULE BY MOUTH EVERY DAY, Disp: 90 capsule, Rfl: 1    predniSONE 20 mg tablet, TAKE 2 TABLETS BY MOUTH EVERY DAY AS  DIRECTED FOR 5 DAYS, Disp: , Rfl:     Riboflavin (Vitamin B-2) 100 MG TABS, TAKE 2 TABS IN AM AND 2 TABS IN PM, Disp: , Rfl:     tamsulosin (FLOMAX) 0.4 mg, Take 0.4 mg by mouth daily , Disp: , Rfl:     Zinc 10 MG LOZG, Apply to the mouth or throat, Disp: , Rfl:   Allergies   Allergen Reactions    Sulfa Antibiotics Hives and Rash     Vitals:    07/11/24 1004   BP: 124/72   Pulse: 67   Resp: 18   Temp: 97.5 °F (36.4 °C)   SpO2: 97%       Physical Exam   Constitutional: General appearance: The Patient is well-developed and well-nourished who appears the stated age in no acute distress. Patient is pleasant and talkative.     HEENT:  Normocephalic.  Sclerae are anicteric. Mucous membranes are moist. Neck is supple without adenopathy. No JVD.     Abdomen: Abdomen is soft, non-tender, non-distended and without masses.     Extremities: There is no clubbing or cyanosis. There is no edema.  Symmetric.  Neuro: Grossly nonfocal. Gait is normal.     Lymphatic: No evidence of cervical adenopathy bilaterally.   No evidence of axillary adenopathy bilaterally.   Skin: Warm, anicteric.    Psych:  Patient is pleasant and talkative.  Breasts:      Pathology:  [unfilled]    Labs:      Imaging  MRI prostate multiparametric wo w contrast    Result Date: 6/28/2024  Narrative: MULTIPARAMETRIC MRI OF THE PROSTATE WITH AND WITHOUT CONTRAST-WITH 3-D POSTPROCESSING INDICATION: R97.20: Elevated prostate specific antigen (PSA). Remote history of colon cancer and thyroid cancer. PSA LEVEL: 5.772 ng/mL on 6/13/2024. PRIOR BIOPSY DATE: Approximately 2 years ago in 2022. BIOPSY RESULTS: Negative. COMPARISON: CT abdomen pelvis 5/27/2014. TECHNIQUE: Multiparametric MRI of the prostate was performed with and without contrast  on a 3T MRI. CONTRAST: Gadobutrol (Gadavist) 8 mL of Gadobutrol injection (SINGLE-DOSE) TECHNICAL LIMITATIONS: None. FINDINGS: PROSTATE: Size: 3.8 x 5.0 x 5.0 cm = 49.7 mL. PSAD= 0.116 ng/mL2 Hemorrhage: None. Benign prostatic  hyperplasia (BPH): Moderate. Focal lesions as follows: Lesion: 1 Size: 0.6 x 0.5 x 0.6 cm, series 350 image 9, series 2 image 21, series 4 image 11. Location: Right base posterolateral peripheral zone. T2-weighted images: Score 4: Circumscribed, homogeneous moderate hypointense focus/mass confined to prostate and less than 1.5 cm in greatest dimension. Diffusion-weighted images: Score 4: Focal markedly hypointense on ADC and markedly hyperintense on high b-value DWI; less than 1.5 cm in greatest dimension. Dynamic post-contrast images: (+) Focal, earlier or contemporaneous with, enhancement of adjacent normal prostatic tissues; corresponds to a finding on T2-weighted and/or DWI. PI-RADS Assessment Category: 4, High (clinically significant cancer is likely to be present). Extra-prostatic extension (EPE): Abuts capsule without visualized EPE. Note: Clinically significant cancer is defined on pathology/histology as Heriberto score greater than or equal to 7, and/or volume of greater than or equal to 0.5 mL, and/or extraprostatic extension. SEMINAL VESICLES : Unremarkable. URINARY BLADDER: A 2.5 cm star-shaped jackstone calculus in the bladder. Circumferential bladder wall thickening, which may be due to partial distention of the urinary bladder and/or chronic bladder outlet obstruction. LYMPH NODES: No pelvic lymphadenopathy. BONES: No suspicious osseous lesion. Stable 5 mm sclerotic lesion in the left iliac wing since 2012, consistent with a bone island.     Impression: 1. PI-RADSv2.1 Category 4 - High (clinically significant cancer is likely to be present). A 0.6 cm lesion in right base posterolateral peripheral zone. 2. No extraprostatic tumor, seminal vesicle invasion, pelvic lymphadenopathy, or pelvic osseous metastatic disease. 3. Calculated prostate volume of 49.7 mL. 4. A 2.5 cm jackstone calculus in the bladder. Prostate gland boundaries and areas of concern for significant prostate cancer were segmented using  3D advanced post-processing on an independent [employer] system workstation with active physician participation. The segmentation was performed should MR-ultrasound fusion biopsy be required. The study was marked in EPIC for significant notification. Workstation performed: [id number]     I personally reviewed and interpreted the above laboratory and imaging data.    Procedure:  Under sterile conditions and local anesthesia, a 4 mm punch biopsy was performed without difficulty.  He tolerated this well.

## 2024-07-12 ENCOUNTER — OFFICE VISIT (OUTPATIENT)
Dept: UROLOGY | Facility: AMBULATORY SURGERY CENTER | Age: 69
End: 2024-07-12
Payer: MEDICARE

## 2024-07-12 VITALS
OXYGEN SATURATION: 97 % | SYSTOLIC BLOOD PRESSURE: 124 MMHG | DIASTOLIC BLOOD PRESSURE: 74 MMHG | HEIGHT: 70 IN | HEART RATE: 62 BPM | BODY MASS INDEX: 26.92 KG/M2 | WEIGHT: 188 LBS

## 2024-07-12 DIAGNOSIS — R97.20 ELEVATED PSA: Primary | ICD-10-CM

## 2024-07-12 PROCEDURE — 99214 OFFICE O/P EST MOD 30 MIN: CPT | Performed by: UROLOGY

## 2024-07-12 NOTE — PROGRESS NOTES
7/12/2024    Terell Teague  1955  [id number]    1. Elevated PSA  Assessment & Plan:  Impression: Elevated PSA, multiparametric MRI of the prostate with PI-RADS 4 scoring    Plan: Based on the patient's MRI results I recommend an MRI fusion biopsy performed in transperineal fashion in the operating room with sedation.  Risks of the procedure were discussed and reviewed at length with the patient and his wife in the office today.  The patient will be scheduled for the next available biopsy.  They are both amenable with this plan.       History of Present Illness  68 y.o. male with a history of an elevated PSA as high as 6.  He had a multiparametric MRI of the prostate performed in [place] followed by a standard transrectal ultrasound-guided biopsy of the prostate which reports is negative for malignancy.  His most recent PSA level when repeated is noted to be 5.77 from June 2024.  This prompted a repeat multiparametric MRI of the prostate performed at [facility] which reveals a 49 g prostate with PI-RADS 4 scoring and a 0.6 cm lesion identified at the right posterior lateral peripheral zone.  He returns to the office today for discussion regarding his MRI results.      AUA Symptom Score      Review of Systems    Past Medical History  History reviewed. No pertinent past medical history.    Past Social History  Past Surgical History:   Procedure Laterality Date   • SUBTOTAL COLECTOMY     • TOTAL THYROIDECTOMY         Past Family History  Family History   Problem Relation Age of Onset   • Thyroid cancer Brother    • Thyroid disease unspecified Brother    • Thyroid cancer Family    • Thyroid disease unspecified Family    • Breast cancer Mother    • Lung cancer Mother    • Ovarian cancer Mother    • Lung cancer Father        Past Social history  Social History     Socioeconomic History   • Marital status: /Civil Union     Spouse name: Not on file   • Number of children: Not on file   • Years of  education: Not on file   • Highest education level: Not on file   Occupational History   • Not on file   Tobacco Use   • Smoking status: Never   • Smokeless tobacco: Never   Substance and Sexual Activity   • Alcohol use: Yes     Comment: Social    • Drug use: Not Currently   • Sexual activity: Not on file   Other Topics Concern   • Not on file   Social History Narrative   • Not on file     Social Determinants of Health     Financial Resource Strain: Not on file   Food Insecurity: Not on file   Transportation Needs: Not on file   Physical Activity: Not on file   Stress: Not on file   Social Connections: Unknown (6/18/2024)    Received from [employer]    Social Connections    • How often do you feel lonely or isolated from those around you? (Adult - for ages 18 years and over): Not on file   Intimate Partner Violence: Not on file   Housing Stability: Not on file       Current Medications  Current Outpatient Medications   Medication Sig Dispense Refill   • allopurinol (ZYLOPRIM) 300 mg tablet      • ALLOPURINOL  mg daily      • atenolol (TENORMIN) 50 mg tablet Take 50 mg by mouth 2 (two) times a day       • b complex vitamins capsule Take 1 capsule by mouth daily     • Bromelains (BROMELAIN PO) Take by mouth     • EPINEPHrine (EPIPEN) 0.3 mg/0.3 mL SOAJ INJECT 1 PEN INTRAMUSCULARLY AS NEEDED AS DIRECTED FOR ALLERGIC REACTIONS     • hydrochlorothiazide (HYDRODIURIL) 25 mg tablet 25 mg daily       • Krill Oil 1000 MG CAPS Taking 1 capsule (400 mg) daily     • levothyroxine 112 mcg tablet Take 1 tablet (112 mcg total) by mouth daily 30 tablet 6   • Magnesium Oxide 250 MG TABS Take 2 tabs daily as tolerated.     • Misc Natural Products (PROSTATE SUPPORT PO) Take by mouth     • Riboflavin (Vitamin B-2) 100 MG TABS TAKE 2 TABS IN AM AND 2 TABS IN PM     • tamsulosin (FLOMAX) 0.4 mg Take 0.4 mg by mouth daily      • Zinc 10 MG LOZG Apply to the mouth or throat     • Cholecalciferol 10 MCG (400 UNIT) CAPS Take 5,000 Units  "by mouth daily (Patient not taking: Reported on 7/12/2024)     • cyclobenzaprine (FLEXERIL) 10 mg tablet TAKE 1 TABLET 3 TIMES A DAY BY ORAL ROUTE AS NEEDED. (Patient not taking: Reported on 7/12/2024)     • famotidine (PEPCID) 20 mg tablet Take 1 tablet twice a day by oral route as directed for 30 days. (Patient not taking: Reported on 7/12/2024)     • lisinopril (ZESTRIL) 20 mg tablet Take 40 mg by mouth daily   (Patient not taking: Reported on 7/12/2024)     • methylPREDNISolone acetate (DEPO-Medrol) injection Take 1 mL by injection route. (Patient not taking: Reported on 7/12/2024)     • omeprazole (PriLOSEC) 20 mg delayed release capsule TAKE 1 CAPSULE BY MOUTH EVERY DAY (Patient not taking: Reported on 7/12/2024) 90 capsule 1   • predniSONE 20 mg tablet TAKE 2 TABLETS BY MOUTH EVERY DAY AS DIRECTED FOR 5 DAYS (Patient not taking: Reported on 7/12/2024)       No current facility-administered medications for this visit.       Allergies  Allergies   Allergen Reactions   • Sulfa Antibiotics Hives and Rash       Past Medical History, Social History, Family History, medications and allergies were reviewed.    Vitals  Vitals:    07/12/24 1319   BP: 124/74   BP Location: Left arm   Patient Position: Sitting   Cuff Size: Standard   Pulse: 62   SpO2: 97%   Weight: 85.3 kg (188 lb)   Height: 5' 10\" (1.778 m)       Physical Exam  On examination he is in no acute distress.  Gait normal.  Affect normal.    Results  Lab Results   Component Value Date    PSA 5.772 (H) 06/13/2024    PSA 5.89 (H) 05/16/2024     Lab Results   Component Value Date    CALCIUM 9.0 06/13/2024    K 4.3 06/13/2024    CO2 28 06/13/2024     06/13/2024    BUN 14 06/13/2024    CREATININE 0.91 06/13/2024     Lab Results   Component Value Date    WBC 9.26 01/28/2022    HGB 17.0 01/28/2022    HCT 50.8 (H) 01/28/2022    MCV 89 01/28/2022     01/28/2022       Office Urine Dip  No results found for this or any previous visit (from the past 1 " hour(s)).]

## 2024-07-12 NOTE — ASSESSMENT & PLAN NOTE
Impression: Elevated PSA, multiparametric MRI of the prostate with PI-RADS 4 scoring    Plan: Based on the patient's MRI results I recommend an MRI fusion biopsy performed in transperineal fashion in the operating room with sedation.  Risks of the procedure were discussed and reviewed at length with the patient and his wife in the office today.  The patient will be scheduled for the next available biopsy.  They are both amenable with this plan.

## 2024-07-16 PROCEDURE — 88305 TISSUE EXAM BY PATHOLOGIST: CPT | Performed by: PATHOLOGY

## 2024-07-17 ENCOUNTER — TELEPHONE (OUTPATIENT)
Dept: SURGICAL ONCOLOGY | Facility: CLINIC | Age: 69
End: 2024-07-17

## 2024-07-17 NOTE — TELEPHONE ENCOUNTER
Left message for patient stating the biopsy results came back as keratosis. Asked patient to call back if he needs his sutures removed.

## 2024-07-18 NOTE — TELEPHONE ENCOUNTER
Patient refuses further vital signs.   Patient states that upon discharge he is going to drive self home. Advised against this practice due to large amount of pain medication he received while in the ED. Explored other transportation options including family, friends, and taxi services. Patient states that he understands my concern but he has been in pain management for a long time and feels he is coherent enough to drive home himself. He states that the nearest person that can bring him home would be over two hours away and his vehicle is in the parking lot. He emphasizes that he will just walk out of the ER if he cannot drive himself home. Discussion with MD and House Supervisor about patient discharge status. Additional education provided to patient about dangers of driving after these substances. Patient accepted education and discharged to waiting room.   Patient returned call. Discussed that he would have sutures removed closer to home.    No further questions at this time.

## 2024-08-08 ENCOUNTER — TELEPHONE (OUTPATIENT)
Dept: GASTROENTEROLOGY | Facility: MEDICAL CENTER | Age: 69
End: 2024-08-08

## 2024-08-15 ENCOUNTER — TELEPHONE (OUTPATIENT)
Age: 69
End: 2024-08-15

## 2024-08-15 NOTE — TELEPHONE ENCOUNTER
Patients wife, Lois, called in double checking the procedure date is 9/4/2024. I advised that is the correct date. Lois verbalized understanding.

## 2024-08-19 ENCOUNTER — TELEPHONE (OUTPATIENT)
Dept: GASTROENTEROLOGY | Facility: CLINIC | Age: 69
End: 2024-08-19

## 2024-08-19 ENCOUNTER — APPOINTMENT (OUTPATIENT)
Dept: LAB | Facility: HOSPITAL | Age: 69
End: 2024-08-19
Payer: MEDICARE

## 2024-08-19 ENCOUNTER — OFFICE VISIT (OUTPATIENT)
Dept: GASTROENTEROLOGY | Facility: CLINIC | Age: 69
End: 2024-08-19
Payer: MEDICARE

## 2024-08-19 VITALS
HEIGHT: 70 IN | HEART RATE: 77 BPM | SYSTOLIC BLOOD PRESSURE: 123 MMHG | OXYGEN SATURATION: 97 % | BODY MASS INDEX: 27.06 KG/M2 | WEIGHT: 189 LBS | DIASTOLIC BLOOD PRESSURE: 77 MMHG | TEMPERATURE: 98.5 F

## 2024-08-19 DIAGNOSIS — K21.9 GASTROESOPHAGEAL REFLUX DISEASE, UNSPECIFIED WHETHER ESOPHAGITIS PRESENT: ICD-10-CM

## 2024-08-19 DIAGNOSIS — R97.20 ELEVATED PROSTATE SPECIFIC ANTIGEN (PSA): ICD-10-CM

## 2024-08-19 DIAGNOSIS — R10.13 EPIGASTRIC BURNING SENSATION: ICD-10-CM

## 2024-08-19 DIAGNOSIS — Z01.810 PRE-OPERATIVE CARDIOVASCULAR EXAMINATION: ICD-10-CM

## 2024-08-19 DIAGNOSIS — Z01.812 PRE-OPERATIVE LABORATORY EXAMINATION: ICD-10-CM

## 2024-08-19 DIAGNOSIS — K22.70 BARRETT'S ESOPHAGUS WITHOUT DYSPLASIA: Primary | ICD-10-CM

## 2024-08-19 DIAGNOSIS — R39.89 SUSPECTED UTI: ICD-10-CM

## 2024-08-19 LAB
ALBUMIN SERPL BCG-MCNC: 4.2 G/DL (ref 3.5–5)
ALP SERPL-CCNC: 45 U/L (ref 34–104)
ALT SERPL W P-5'-P-CCNC: 28 U/L (ref 7–52)
ANION GAP SERPL CALCULATED.3IONS-SCNC: 5 MMOL/L (ref 4–13)
AST SERPL W P-5'-P-CCNC: 19 U/L (ref 13–39)
ATRIAL RATE: 67 BPM
BASOPHILS # BLD AUTO: 0.05 THOUSANDS/ÂΜL (ref 0–0.1)
BASOPHILS NFR BLD AUTO: 1 % (ref 0–1)
BILIRUB SERPL-MCNC: 0.67 MG/DL (ref 0.2–1)
BUN SERPL-MCNC: 16 MG/DL (ref 5–25)
CALCIUM SERPL-MCNC: 9.4 MG/DL (ref 8.4–10.2)
CHLORIDE SERPL-SCNC: 104 MMOL/L (ref 96–108)
CO2 SERPL-SCNC: 30 MMOL/L (ref 21–32)
CREAT SERPL-MCNC: 0.95 MG/DL (ref 0.6–1.3)
EOSINOPHIL # BLD AUTO: 0.08 THOUSAND/ÂΜL (ref 0–0.61)
EOSINOPHIL NFR BLD AUTO: 1 % (ref 0–6)
ERYTHROCYTE [DISTWIDTH] IN BLOOD BY AUTOMATED COUNT: 14 % (ref 11.6–15.1)
GFR SERPL CREATININE-BSD FRML MDRD: 80 ML/MIN/1.73SQ M
GLUCOSE SERPL-MCNC: 83 MG/DL (ref 65–140)
HCT VFR BLD AUTO: 46 % (ref 36.5–49.3)
HGB BLD-MCNC: 15.1 G/DL (ref 12–17)
IMM GRANULOCYTES # BLD AUTO: 0.05 THOUSAND/UL (ref 0–0.2)
IMM GRANULOCYTES NFR BLD AUTO: 1 % (ref 0–2)
LYMPHOCYTES # BLD AUTO: 2.19 THOUSANDS/ÂΜL (ref 0.6–4.47)
LYMPHOCYTES NFR BLD AUTO: 25 % (ref 14–44)
MCH RBC QN AUTO: 28.8 PG (ref 26.8–34.3)
MCHC RBC AUTO-ENTMCNC: 32.8 G/DL (ref 31.4–37.4)
MCV RBC AUTO: 88 FL (ref 82–98)
MONOCYTES # BLD AUTO: 1.05 THOUSAND/ÂΜL (ref 0.17–1.22)
MONOCYTES NFR BLD AUTO: 12 % (ref 4–12)
NEUTROPHILS # BLD AUTO: 5.5 THOUSANDS/ÂΜL (ref 1.85–7.62)
NEUTS SEG NFR BLD AUTO: 60 % (ref 43–75)
NRBC BLD AUTO-RTO: 0 /100 WBCS
P AXIS: 33 DEGREES
PLATELET # BLD AUTO: 177 THOUSANDS/UL (ref 149–390)
PMV BLD AUTO: 10.1 FL (ref 8.9–12.7)
POTASSIUM SERPL-SCNC: 3.7 MMOL/L (ref 3.5–5.3)
PR INTERVAL: 162 MS
PROT SERPL-MCNC: 6.8 G/DL (ref 6.4–8.4)
QRS AXIS: 61 DEGREES
QRSD INTERVAL: 94 MS
QT INTERVAL: 412 MS
QTC INTERVAL: 435 MS
RBC # BLD AUTO: 5.24 MILLION/UL (ref 3.88–5.62)
SODIUM SERPL-SCNC: 139 MMOL/L (ref 135–147)
T WAVE AXIS: 42 DEGREES
VENTRICULAR RATE: 67 BPM
WBC # BLD AUTO: 8.92 THOUSAND/UL (ref 4.31–10.16)

## 2024-08-19 PROCEDURE — 85025 COMPLETE CBC W/AUTO DIFF WBC: CPT

## 2024-08-19 PROCEDURE — 99214 OFFICE O/P EST MOD 30 MIN: CPT | Performed by: INTERNAL MEDICINE

## 2024-08-19 PROCEDURE — 36415 COLL VENOUS BLD VENIPUNCTURE: CPT

## 2024-08-19 PROCEDURE — G2211 COMPLEX E/M VISIT ADD ON: HCPCS | Performed by: INTERNAL MEDICINE

## 2024-08-19 PROCEDURE — 93010 ELECTROCARDIOGRAM REPORT: CPT | Performed by: INTERNAL MEDICINE

## 2024-08-19 PROCEDURE — 87086 URINE CULTURE/COLONY COUNT: CPT

## 2024-08-19 PROCEDURE — 80053 COMPREHEN METABOLIC PANEL: CPT

## 2024-08-19 NOTE — PROGRESS NOTES
[facility] - Outpatient Follow-up Note  Terell Teague 69 y.o. male MRN: [id number]  Encounter: [id number]          ASSESSMENT AND PLAN:    Terell Teague is a 69 y.o. male with stage III colon cancer status post partial resection in 2012 and doing well since, concern for Varela's esophagus, who now presents for follow-up.  Overall he is doing well but is due for repeat endoscopy at this time.    Prior endoscopy reportedly showed Varela's esophagus.  Prior colonoscopy reportedly normal.    MRI of the prostate from June 2024 with category four 0.6 cm lesion in the right base posterior lateral peripheral zone.    Recent skin biopsy with seborrheic keratosis.  Most recent BMP with normal electrolytes and creatinine.  Most recent PSA elevated.  Most recent CMP with slightly elevated ALT to 61 and albumin low at 3.7, as well as BUN elevated at 21 but otherwise normal.  Most recent CBC with slightly elevated white blood cell count of 11.72 with normal hemoglobin, MCV, platelets.  Magnesium, phosphorus, INR normal.  TSH normal.  Troponin slightly elevated.    1. 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 Epigastric burning sensation    3. Gastroesophageal reflux disease, unspecified whether esophagitis present        Orders Placed This Encounter   Procedures    EGD     Recommend restarting low-dose omeprazole given prior concern for Varela's esophagus  Repeat EGD now for varela's surveillance  Next colonoscopy reportedly due in the next 1 to 3 years    ______________________________________________________________________    SUBJECTIVE:    Terell Teague is a 69 y.o. male who presents with complaint of GERD.     He had an EGD with Dr. Rizo. She was told thrush and pre-cancerous changes. He was getting waves of hunger pains in the morning when he would wake up. In the morning he would eat and then feel better. He would try t eat lunch at 2pm and get hunger pains. He does  not take omeprazole. No heartburn. He is not eating until later. He will eat later. Not necessarily better on the omeprazole. Appetite has been good. No significant weight gain. He is very active.     Wt Readings from Last 3 Encounters:   08/19/24 85.7 kg (189 lb)   07/12/24 85.3 kg (188 lb)   07/11/24 86.6 kg (191 lb)           REVIEW OF SYSTEMS IS OTHERWISE NEGATIVE.  10 point ROS reviewed and negative, except as above      Historical Information   Past Medical History:   Diagnosis Date    Colon cancer (HCC) 6/1/2011    GERD (gastroesophageal reflux disease) 2/1/2022    Hypertension 1/2/2021     Past Surgical History:   Procedure Laterality Date    ABDOMINAL SURGERY  6/6/2011    COLONOSCOPY  5/1/2019    SUBTOTAL COLECTOMY      TOTAL THYROIDECTOMY       Social History   Social History     Substance and Sexual Activity   Alcohol Use Yes    Alcohol/week: 3.0 standard drinks of alcohol    Types: 3 Cans of beer per week    Comment: Social      Social History     Substance and Sexual Activity   Drug Use Never     Social History     Tobacco Use   Smoking Status Never   Smokeless Tobacco Never     Family History   Problem Relation Age of Onset    Thyroid cancer Brother     Thyroid disease unspecified Brother     Thyroid cancer Family     Thyroid disease unspecified Family     Breast cancer Mother     Lung cancer Mother     Ovarian cancer Mother     Lung cancer Father     Cancer Father     Inflammatory bowel disease Father        Meds/Allergies       Current Outpatient Medications:     allopurinol (ZYLOPRIM) 300 mg tablet    ALLOPURINOL PO    atenolol (TENORMIN) 50 mg tablet    b complex vitamins capsule    Cholecalciferol 10 MCG (400 UNIT) CAPS    hydrochlorothiazide (HYDRODIURIL) 25 mg tablet    Krill Oil 1000 MG CAPS    levothyroxine 112 mcg tablet    lisinopril (ZESTRIL) 20 mg tablet    Magnesium Oxide 250 MG TABS    Misc Natural Products (PROSTATE SUPPORT PO)    omeprazole (PriLOSEC) 20 mg delayed release capsule     "tamsulosin (FLOMAX) 0.4 mg    Zinc 10 MG LOZG    Bromelains (BROMELAIN PO)    cyclobenzaprine (FLEXERIL) 10 mg tablet    EPINEPHrine (EPIPEN) 0.3 mg/0.3 mL SOAJ    famotidine (PEPCID) 20 mg tablet    methylPREDNISolone acetate (DEPO-Medrol) injection    predniSONE 20 mg tablet    Riboflavin (Vitamin B-2) 100 MG TABS    Allergies   Allergen Reactions    Sulfa Antibiotics Hives and Rash           Objective     Blood pressure 123/77, pulse 77, temperature 98.5 °F (36.9 °C), temperature source Tympanic, height 5' 10\" (1.778 m), weight 85.7 kg (189 lb), SpO2 97%. Body mass index is 27.12 kg/m².    PHYSICAL EXAMINATION:    General Appearance:   Alert, cooperative, no distress   HEENT:  Normocephalic, atraumatic, anicteric. Neck supple, symmetrical, trachea midline.   Lungs:   Equal chest rise and unlabored breathing, normal effort, no coughing.   Cardiovascular:   No visualized JVD.   Abdomen:   No abdominal distension.   Skin:   No jaundice, rashes, or lesions.    Musculoskeletal:   Normal range of motion visualized.   Psych:  Normal affect and normal insight.   Neuro:  Alert and appropriate.         Lab Results:   No visits with results within 1 Day(s) from this visit.   Latest known visit with results is:   Consult on 07/11/2024   Component Date Value    Case Report 07/11/2024                      Value:Surgical Pathology Report                         Case: [id number]                                  Authorizing Provider:  Eran Christiansen MD           Collected:           07/11/2024 1153              Ordering Location:     [facility]     Received:            07/11/2024 1153                                     Surgical Oncology                                                                                   [facility]                                                         Pathologist:           José Seymour MD                                                      Specimen:    Back           " "                                                                            Final Diagnosis 07/11/2024                      Value:A. Skin, Back:  SEBORRHEIC KERATOSIS, INFLAMED        Note 07/11/2024                      Value:Interpretation performed at [facility] [address] [address]        Additional Information 07/11/2024                      Value:All reported additional testing was performed with appropriately reactive controls.  These tests were developed and their performance characteristics determined by [facility] or appropriate performing facility, though some tests may be performed on tissues which have not been validated for performance characteristics (such as staining performed on alcohol exposed cell blocks and decalcified tissues).  Results should be interpreted with caution and in the context of the patients’ clinical condition. These tests may not be cleared or approved by the U.S. Food and Drug Administration, though the FDA has determined that such clearance or approval is not necessary. These tests are used for clinical purposes and they should not be regarded as investigational or for research. This laboratory has been approved by CLIA 88, designated as a high-complexity laboratory and is qualified to perform these tests.  .      Gross Description 07/11/2024                      Value:A. The specimen is received in formalin, labeled with the patient's name and hospital number, and is designated \" back\".  The specimen consists of a 0.4 x 0.4 cm punch biopsy of brown keratotic skin excised to a depth of 0.4 cm.  The epithelial surface is inked red and the margin of resection is inked green.  The specimen is bisected and is entirely submitted between sponges, 1 cassette.    Note: The estimated total formalin fixation time based upon information provided by the submitting clinician and the standard processing schedule is under 72 hours.  -VVonelli " "David      Clinical Information 07/11/2024                      Value:4.0 punch biopsy of skin lesion on back       Lab Results   Component Value Date    WBC 9.26 01/28/2022    HGB 17.0 01/28/2022    HCT 50.8 (H) 01/28/2022    MCV 89 01/28/2022     01/28/2022       Lab Results   Component Value Date    SODIUM 138 06/13/2024    K 4.3 06/13/2024     06/13/2024    CO2 28 06/13/2024    AGAP 4 06/13/2024    BUN 14 06/13/2024    CREATININE 0.91 06/13/2024    GLUC 96 05/16/2024    GLUF 99 06/13/2024    CALCIUM 9.0 06/13/2024    AST 36 02/01/2024    ALT 61 (H) 02/01/2024    ALKPHOS 49 02/01/2024    TP 6.3 02/01/2024    TBILI 0.9 02/01/2024    EGFR 85 06/13/2024       No results found for: \"CRP\"    Lab Results   Component Value Date    XVN6EQMVDZSI 6.650 (H) 01/28/2022    TSH 0.58 01/31/2024       Lab Results   Component Value Date    IRON 97 01/28/2022    TIBC 395 01/28/2022    FERRITIN 131 01/28/2022       Radiology Results:   No results found.  "

## 2024-08-19 NOTE — TELEPHONE ENCOUNTER
Procedure: EGD  Date: Nov 1  Physician performing: Dr. Miller  Location of procedure:  [facility]  Instructions given to patient: EGD  Diabetic: n/a  Clearances: n/a

## 2024-08-20 LAB — BACTERIA UR CULT: NORMAL

## 2024-08-29 NOTE — PRE-PROCEDURE INSTRUCTIONS
Pre-Surgery Instructions:   Medication Instructions    ALLOPURINOL PO Take day of surgery.    atenolol (TENORMIN) 50 mg tablet Take day of surgery.    b complex vitamins capsule Stop taking 7 days prior to surgery.    Cholecalciferol 10 MCG (400 UNIT) CAPS Stop taking 7 days prior to surgery.    hydrochlorothiazide (HYDRODIURIL) 25 mg tablet Hold day of surgery.    Krill Oil 1000 MG CAPS Stop taking 7 days prior to surgery.    levothyroxine 112 mcg tablet Take day of surgery.    lisinopril (ZESTRIL) 20 mg tablet Hold day of surgery.    Magnesium Oxide 250 MG TABS Stop taking 7 days prior to surgery.    Misc Natural Products (PROSTATE SUPPORT PO) Stop taking 7 days prior to surgery.    omeprazole (PriLOSEC) 20 mg delayed release capsule Take day of surgery.    tamsulosin (FLOMAX) 0.4 mg Take day of surgery.    Zinc 10 MG LOZG Stop taking 7 days prior to surgery.    Medication instructions for day surgery reviewed. Please use only a sip of water to take your instructed medications. Avoid all over the counter vitamins, supplements and NSAIDS for one week prior to surgery per anesthesia guidelines. Tylenol is ok to take as needed.     You will receive a call one business day prior to surgery with an arrival time and hospital directions. If your surgery is scheduled on a Monday, the hospital will be calling you on the Friday prior to your surgery. If you have not heard from anyone by 8pm, please call the hospital supervisor through the hospital  at [phone]. ([place] [phone] or [place] [phone]).    Do not eat or drink anything after midnight the night before your surgery, including candy, mints, lifesavers, or chewing gum. Do not drink alcohol 24hrs before your surgery. Try not to smoke at least 24hrs before your surgery.       Follow the pre surgery showering instructions as listed in the “My Surgical Experience Booklet” or otherwise provided by your surgeon's office. Do not use a blade to shave  the surgical area 1 week before surgery. It is okay to use a clean electric clippers up to 24 hours before surgery. Do not apply any lotions, creams, including makeup, cologne, deodorant, or perfumes after showering on the day of your surgery. Do not use dry shampoo, hair spray, hair gel, or any type of hair products.     No contact lenses, eye make-up, or artificial eyelashes. Remove nail polish, including gel polish, and any artificial, gel, or acrylic nails if possible. Remove all jewelry including rings and body piercing jewelry.     Wear causal clothing that is easy to take on and off. Consider your type of surgery.    Keep any valuables, jewelry, piercings at home. Please bring any specially ordered equipment (sling, braces) if indicated.    Arrange for a responsible person to drive you to and from the hospital on the day of your surgery. Please confirm the visitor policy for the day of your procedure when you receive your phone call with an arrival time.     Call the surgeon's office with any new illnesses, exposures, or additional questions prior to surgery.    Please reference your “My Surgical Experience Booklet” for additional information to prepare for your upcoming surgery.

## 2024-09-01 ENCOUNTER — ANESTHESIA EVENT (OUTPATIENT)
Dept: PERIOP | Facility: HOSPITAL | Age: 69
End: 2024-09-01
Payer: MEDICARE

## 2024-09-04 ENCOUNTER — HOSPITAL ENCOUNTER (OUTPATIENT)
Facility: HOSPITAL | Age: 69
Setting detail: OUTPATIENT SURGERY
Discharge: HOME | End: 2024-09-04
Attending: UROLOGY | Admitting: UROLOGY
Payer: MEDICARE

## 2024-09-04 ENCOUNTER — ANESTHESIA (OUTPATIENT)
Dept: PERIOP | Facility: HOSPITAL | Age: 69
End: 2024-09-04
Payer: MEDICARE

## 2024-09-04 VITALS
OXYGEN SATURATION: 93 % | TEMPERATURE: 97.3 F | HEART RATE: 67 BPM | BODY MASS INDEX: 26.86 KG/M2 | DIASTOLIC BLOOD PRESSURE: 62 MMHG | HEIGHT: 70 IN | RESPIRATION RATE: 18 BRPM | SYSTOLIC BLOOD PRESSURE: 116 MMHG | WEIGHT: 187.61 LBS

## 2024-09-04 DIAGNOSIS — R97.20 ELEVATED PROSTATE SPECIFIC ANTIGEN (PSA): ICD-10-CM

## 2024-09-04 PROCEDURE — NC001 PR NO CHARGE: Performed by: UROLOGY

## 2024-09-04 PROCEDURE — 88344 IMHCHEM/IMCYTCHM EA MLT ANTB: CPT | Performed by: STUDENT IN AN ORGANIZED HEALTH CARE EDUCATION/TRAINING PROGRAM

## 2024-09-04 PROCEDURE — G0416 PROSTATE BIOPSY, ANY MTHD: HCPCS | Performed by: STUDENT IN AN ORGANIZED HEALTH CARE EDUCATION/TRAINING PROGRAM

## 2024-09-04 PROCEDURE — 55700 PR PROSTATE NEEDLE BIOPSY ANY APPROACH: CPT | Performed by: UROLOGY

## 2024-09-04 PROCEDURE — 76942 ECHO GUIDE FOR BIOPSY: CPT | Performed by: UROLOGY

## 2024-09-04 RX ORDER — LIDOCAINE HYDROCHLORIDE 10 MG/ML
INJECTION, SOLUTION EPIDURAL; INFILTRATION; INTRACAUDAL; PERINEURAL AS NEEDED
Status: DISCONTINUED | OUTPATIENT
Start: 2024-09-04 | End: 2024-09-04 | Stop reason: HOSPADM

## 2024-09-04 RX ORDER — SODIUM CHLORIDE, SODIUM LACTATE, POTASSIUM CHLORIDE, CALCIUM CHLORIDE 600; 310; 30; 20 MG/100ML; MG/100ML; MG/100ML; MG/100ML
125 INJECTION, SOLUTION INTRAVENOUS CONTINUOUS
Status: DISCONTINUED | OUTPATIENT
Start: 2024-09-04 | End: 2024-09-04 | Stop reason: HOSPADM

## 2024-09-04 RX ORDER — PROPOFOL 10 MG/ML
INJECTION, EMULSION INTRAVENOUS CONTINUOUS PRN
Status: DISCONTINUED | OUTPATIENT
Start: 2024-09-04 | End: 2024-09-04

## 2024-09-04 RX ORDER — PROPOFOL 10 MG/ML
INJECTION, EMULSION INTRAVENOUS AS NEEDED
Status: DISCONTINUED | OUTPATIENT
Start: 2024-09-04 | End: 2024-09-04

## 2024-09-04 RX ORDER — DEXAMETHASONE SODIUM PHOSPHATE 10 MG/ML
INJECTION, SOLUTION INTRAMUSCULAR; INTRAVENOUS AS NEEDED
Status: DISCONTINUED | OUTPATIENT
Start: 2024-09-04 | End: 2024-09-04

## 2024-09-04 RX ORDER — ACETAMINOPHEN 325 MG/1
650 TABLET ORAL EVERY 6 HOURS PRN
Status: DISCONTINUED | OUTPATIENT
Start: 2024-09-04 | End: 2024-09-04 | Stop reason: HOSPADM

## 2024-09-04 RX ORDER — ONDANSETRON 2 MG/ML
INJECTION INTRAMUSCULAR; INTRAVENOUS AS NEEDED
Status: DISCONTINUED | OUTPATIENT
Start: 2024-09-04 | End: 2024-09-04

## 2024-09-04 RX ORDER — FENTANYL CITRATE 50 UG/ML
INJECTION, SOLUTION INTRAMUSCULAR; INTRAVENOUS AS NEEDED
Status: DISCONTINUED | OUTPATIENT
Start: 2024-09-04 | End: 2024-09-04

## 2024-09-04 RX ORDER — ONDANSETRON 2 MG/ML
4 INJECTION INTRAMUSCULAR; INTRAVENOUS ONCE AS NEEDED
Status: DISCONTINUED | OUTPATIENT
Start: 2024-09-04 | End: 2024-09-04 | Stop reason: HOSPADM

## 2024-09-04 RX ORDER — CEFAZOLIN SODIUM 2 G/50ML
2000 SOLUTION INTRAVENOUS
Status: COMPLETED | OUTPATIENT
Start: 2024-09-04 | End: 2024-09-04

## 2024-09-04 RX ORDER — MIDAZOLAM HYDROCHLORIDE 2 MG/2ML
INJECTION, SOLUTION INTRAMUSCULAR; INTRAVENOUS AS NEEDED
Status: DISCONTINUED | OUTPATIENT
Start: 2024-09-04 | End: 2024-09-04

## 2024-09-04 RX ORDER — FENTANYL CITRATE/PF 50 MCG/ML
25 SYRINGE (ML) INJECTION
Status: DISCONTINUED | OUTPATIENT
Start: 2024-09-04 | End: 2024-09-04 | Stop reason: HOSPADM

## 2024-09-04 RX ORDER — LIDOCAINE HYDROCHLORIDE 10 MG/ML
INJECTION, SOLUTION EPIDURAL; INFILTRATION; INTRACAUDAL; PERINEURAL AS NEEDED
Status: DISCONTINUED | OUTPATIENT
Start: 2024-09-04 | End: 2024-09-04

## 2024-09-04 RX ORDER — GLYCOPYRROLATE 0.2 MG/ML
INJECTION INTRAMUSCULAR; INTRAVENOUS AS NEEDED
Status: DISCONTINUED | OUTPATIENT
Start: 2024-09-04 | End: 2024-09-04

## 2024-09-04 RX ADMIN — SODIUM CHLORIDE, SODIUM LACTATE, POTASSIUM CHLORIDE, AND CALCIUM CHLORIDE 125 ML/HR: .6; .31; .03; .02 INJECTION, SOLUTION INTRAVENOUS at 11:05

## 2024-09-04 RX ADMIN — MIDAZOLAM 2 MG: 1 INJECTION INTRAMUSCULAR; INTRAVENOUS at 13:10

## 2024-09-04 RX ADMIN — DEXAMETHASONE SODIUM PHOSPHATE 10 MG: 10 INJECTION INTRAMUSCULAR; INTRAVENOUS at 13:19

## 2024-09-04 RX ADMIN — FENTANYL CITRATE 100 MCG: 50 INJECTION INTRAMUSCULAR; INTRAVENOUS at 13:12

## 2024-09-04 RX ADMIN — LIDOCAINE HYDROCHLORIDE 100 MG: 10 INJECTION, SOLUTION EPIDURAL; INFILTRATION; INTRACAUDAL; PERINEURAL at 13:12

## 2024-09-04 RX ADMIN — SODIUM CHLORIDE, SODIUM LACTATE, POTASSIUM CHLORIDE, AND CALCIUM CHLORIDE 125 ML/HR: .6; .31; .03; .02 INJECTION, SOLUTION INTRAVENOUS at 14:18

## 2024-09-04 RX ADMIN — PROPOFOL 50 MG: 10 INJECTION, EMULSION INTRAVENOUS at 13:16

## 2024-09-04 RX ADMIN — GLYCOPYRROLATE 0.2 MCG: 0.2 INJECTION, SOLUTION INTRAMUSCULAR; INTRAVENOUS at 13:12

## 2024-09-04 RX ADMIN — PROPOFOL 100 MCG/KG/MIN: 10 INJECTION, EMULSION INTRAVENOUS at 13:16

## 2024-09-04 RX ADMIN — ONDANSETRON 4 MG: 2 INJECTION INTRAMUSCULAR; INTRAVENOUS at 13:19

## 2024-09-04 RX ADMIN — CEFAZOLIN SODIUM 2000 MG: 2 SOLUTION INTRAVENOUS at 13:12

## 2024-09-04 NOTE — ANESTHESIA POSTPROCEDURE EVALUATION
Post-Op Assessment Note    CV Status:  Stable  Pain Score: 0    Pain management: adequate       Mental Status:  Alert and awake   Hydration Status:  Stable   PONV Controlled:  None   Airway Patency:  Patent and adequate     Post Op Vitals Reviewed: Yes    No anethesia notable event occurred.    Staff: CRNA               BP   99/56   Temp   98   Pulse  70   Resp   12   SpO2   99

## 2024-09-04 NOTE — OP NOTE
OPERATIVE REPORT  PATIENT NAME: Terell Teague    :  1955  MRN: [id number]  Pt Location: [facility] OR ROOM 04    SURGERY DATE: 2024    Surgeons and Role:     * Torres Zavala,  - Primary    Preop Diagnosis:  Elevated prostate specific antigen (PSA) [R97.20]    Post-Op Diagnosis Codes:     * Elevated prostate specific antigen (PSA) [R97.20]    Procedure(s):  TRANSPERINEAL MRI FUSION BX PROSTATE    Specimen(s):  ID Type Source Tests Collected by Time Destination   1 : RIGHT ANTERIOR MEDIAL Tissue Prostate TISSUE EXAM Torres Zavala, DO 2024 1326    2 : RIGHT ANTERIOR LATERAL Tissue Prostate TISSUE EXAM Torres Zavala, DO 2024 1326    3 : RIGHT POSTERIOR LATERAL Tissue Prostate TISSUE EXAM Torres Zavala, DO 2024 1326    4 : LEFT ANTERIOR MEDIAL Tissue Prostate TISSUE EXAM Torres Zavala, DO 2024 1326    5 : LEFT ANTERIOR LATERAL Tissue Prostate TISSUE EXAM Torres Zavala, DO 2024 1326    6 : LEFT POSTERIOR LATERAL Tissue Prostate TISSUE EXAM Torres Zavala, DO 2024 1326    7 : LEFT POSTERIOR MEDIAL Tissue Prostate TISSUE EXAM Torres Zavala, DO 2024 1326    8 : LEFT BASE Tissue Prostate TISSUE EXAM Torres Zavala, DO 2024 1326    9 : RIGHT POSTERIOR MEDIAL Tissue Prostate TISSUE EXAM Torres Zavala, DO 2024 1326    10 : RIGHT BASE Tissue Prostate TISSUE EXAM Torres Zavala, DO 2024 1326    11 : DIANA # 1 R base posterior lateral PZ Tissue Prostate TISSUE EXAM Torres Zavala, DO 2024 1329        Estimated Blood Loss:   Minimal    Drains:  * No LDAs found *    Anesthesia Type:   IV Sedation with Anesthesia    Operative Indications:  Elevated prostate specific antigen (PSA) [R97.20]        69 y.o. old male with elevated PSA     No anticoagulation     Previous history of negative standard [facility]rect[facility]     PSA 5.89 on 2024     PSA 5.77 on 2024      Prostate MRI 6/24/2024: PI-RADS 4 lesion right base posterior lateral peripheral zone; negative EPE/SVI/LAD/bony metastasis; prostate 49.7 g; 2.5 cm nick stone calculus in bladder        Operative Findings:        FINDINGS:  - No hypoechoic lesions were found  - A total of 10 standard template cores were taken, in order to provide saturation sampling to maximize diagnostic sensitivity  - 4 cores were taken from DIANA #1, which was labelled right base posterior lateral peripheral zone            Complications:   None    Procedure and Technique:          The patient is here for transperineal prostate saturation biopsy guided by biplanar transrectal ultrasound using the Precision Point Perineologic kit and uroNav device for MR fusion.      The procedure, as well as the risks of bleeding, infection, and urinary retention have been explained and consent has been given.     The patient was brought to the operating room and identified properly.  A time-out was performed.  IV sedation was induced, and he was placed in the dorsal lithotomy position.  Patient received Ancef for IV antibiotics. The perineum shaved, and Tegaderm was used to lift the scrotum away from the perineum.  ChloraPrep was used to prep the perineum.  Care was taken when placing the patient in the dorsal lithotomy position to make sure all pressure points were protected.  The biplanar ultrasound probe was prepared with a condom and precision Point kit on that. I placed the Precision Point device with a clamp over the midportion of the ultrasound.  The aperture and guide needle were also prepared to be used later, and I placed the transrectal ultrasound probe into the rectum and visualized the prostate in sagittal and transverse views. See above findings for details. Split screen was utilized.      Transverse view was obtained, Volumetric sweep was performed for the purpose of fusing the MRI images with the ultrasound images.      The perineum was  anesthetized with 2% xylocaine 10 cc total both superficially and deep with a spinal needle on both sides of the median raphe.     I then performed biopsies of the region of interest after placing the introducer needle into the perineum and took multiple biopsies with MR fusion guidance.  Realtime ultrasound is used in addition to the UroNav verification of each target sampling.  Additionally, I verified on the back table that adequate sampling was obtained grossly from each specimen.  Multiple samples were taken from the targeted lesion in order provide full saturation of the region of concern on MRI.    I then performed standard transperineal prostate biopsy without MR guidance, taking the biopsies from the peripheral zone in the standard fashion of anterior medial, anterior lateral, posterior medial, posterior lateral, and base regions bilaterally.  The top and second to top aperture settings were used to get the anterior zones, and the second to bottom aperture settings were used to obtain the posterior zones.  These were all peripheral zone biopsies.  Extra biopsies were taken in zones where the initial biopsy was suboptimal tissue.  Care was taken to try to include the entire length of the prostate as much as possible for complete saturation coverage.   Excellent prostate tissue cores were obtained, and specimens were sent to pathology.  I withdrew the guide needle and I held pressure on the perineum for 1 minute using gauze sponges.  The perineum was then cleansed with sterile water.    Patient was uneventfully awoken from anesthesia and brought to PACU in good condition.               A qualified resident physician was not available.    Patient Disposition:  PACU              SIGNATURE: Torres Zavala DO  DATE: September 4, 2024  TIME: 1:33 PM

## 2024-09-04 NOTE — DISCHARGE INSTR - AVS FIRST PAGE
You had transperineal prostate biopsy. You have puncture sites in your perineum. These areas may have some slight oozing or bruising. You may put gauze over the site as needed.    Starting the day after surgery, you may shower. Do not take any baths/hot tubs/go in any standing body of water until your perineum puncture sites have totally healed (this can take about 1 week).    You may see blood in your urine, stool, or semen for the next few days. Call office if it is persistent.    Please call office if you pass large blood clots in your urine or if you are unable to urinate.    Generally speaking, it is normal for your urine to be fruit punch or lighter in color in terms of the bloodiness.  This should resolve over time.    Regardless of the appearance of your urine, you should make sure that you are staying well-hydrated.    Please call office or present to ED immediately if you experience fevers or chills.    You may take over the counter Tylenol as needed for pain.    Try avoid too much physical activity on the day of your procedure. Try to avoid long car rides or sitting down on a hard chair for at least a few days.     You are allowed to walk as usual on the day after surgery. Avoid heavy lifting for about 5 days after your procedure (as long as you are not experiencing any bleeding at that time). Avoid activities that put extra pressure on your perineum (bike riding, horse riding, etc.) for 2-3 weeks. Again, if you are still experiencing bleeding at this time, please do not partake in these activities.    You will follow up in the office on 9/24/2024 at 2:15 PM with Dr. Bruce.     You may see your pathology results before our office does.  You may have a lot of questions you would like to ask.  You already are (or will soon be) scheduled for an office visit with a physician, at which time you will have the full undivided attention of the physician to talk about your pathology results.  Please do your best  not to call the office about your pathology results, as this often leads to more anxiety. You will find that it is a significantly more productive conversation when performed face-to-face.

## 2024-09-04 NOTE — ANESTHESIA PREPROCEDURE EVALUATION
Procedure:  TRANSPERINEAL MRI FUSION BX PROSTATE (Perineum)    Relevant Problems   CARDIO   (+) Hypertension      ENDO   (+) Hypothyroidism      Urinary   (+) Elevated PSA      Oncology   (+) Personal history of colon cancer, stage III   (+) Thyroid cancer (HCC)        Physical Exam    Airway    Mallampati score: I  TM Distance: >3 FB  Neck ROM: full     Dental   No notable dental hx     Cardiovascular  Cardiovascular exam normal    Pulmonary  Pulmonary exam normal     Other Findings        Anesthesia Plan  ASA Score- 3     Anesthesia Type- IV sedation with anesthesia with ASA Monitors.         Additional Monitors:     Airway Plan:            Plan Factors-Exercise tolerance (METS): >4 METS.    Chart reviewed.   Existing labs reviewed. Patient summary reviewed.    Patient is not a current smoker.      Obstructive sleep apnea risk education given perioperatively.        Induction- intravenous.    Postoperative Plan- Plan for postoperative opioid use.     Perioperative Resuscitation Plan - Level 1 - Full Code.       Informed Consent- Anesthetic plan and risks discussed with patient.

## 2024-09-04 NOTE — H&P
UROLOGY H&P NOTE     Patient Identifiers: Terell Teague (MRN [id number])    Date of Service: 9/4/2024    History of Present Illness:     Terell Teague is a 69 y.o. old with a history of elevated PSA    Past Medical, Past Surgical History:     Past Medical History:   Diagnosis Date    Colon cancer (HCC) 6/1/2011    GERD (gastroesophageal reflux disease) 2/1/2022    Hypertension 1/2/2021   :    Past Surgical History:   Procedure Laterality Date    ABDOMINAL SURGERY  6/6/2011    COLONOSCOPY  5/1/2019    SUBTOTAL COLECTOMY      TOTAL THYROIDECTOMY     :    Medications, Allergies:     No current facility-administered medications for this encounter.       Allergies:  Allergies   Allergen Reactions    Sulfa Antibiotics Hives and Rash   :    Social and Family History:   Social History:   Social History     Tobacco Use    Smoking status: Never    Smokeless tobacco: Never   Vaping Use    Vaping status: Never Used   Substance Use Topics    Alcohol use: Yes     Alcohol/week: 3.0 standard drinks of alcohol     Types: 3 Cans of beer per week     Comment: Social     Drug use: Never   .    Social History     Tobacco Use   Smoking Status Never   Smokeless Tobacco Never       Family History:  Family History   Problem Relation Age of Onset    Thyroid cancer Brother     Thyroid disease unspecified Brother     Thyroid cancer Family     Thyroid disease unspecified Family     Breast cancer Mother     Lung cancer Mother     Ovarian cancer Mother     Lung cancer Father     Cancer Father     Inflammatory bowel disease Father    :     Review of Systems:     General: Fever, chills, or night sweats: negative  Cardiac: Negative for chest pain.    Pulmonary: Negative for shortness of breath.  Gastrointestinal: Abdominal pain negative.  Nausea, vomiting, or diarrhea negative,  Genitourinary: See HPI above.  Patient does not have hematuria.  All other systems queried were negative.    Physical Exam:   General: Patient is pleasant and in  NAD. Awake and alert  There were no vitals taken for this visit.No data recorded.  current;    No intake/output data recorded.  Cardiac: Peripheral edema: negative  Pulmonary: Non-labored breathing  Abdomen: Soft, non-tender, non-distended.  No surgical scars.  No masses, tenderness, hernias noted.    Genitourinary: Negative CVA tenderness, negative suprapubic tenderness.    Labs:     Lab Results   Component Value Date    HGB 15.1 08/19/2024    HCT 46.0 08/19/2024    WBC 8.92 08/19/2024     08/19/2024   ]    Lab Results   Component Value Date    K 3.7 08/19/2024     08/19/2024    CO2 30 08/19/2024    BUN 16 08/19/2024    CREATININE 0.95 08/19/2024    CALCIUM 9.4 08/19/2024   ]    Imaging:   I personally reviewed the images and report of the following studies, and reviewed them with the patient:    Reviewed      ASSESSMENT:     69 y.o. old male with elevated PSA    No anticoagulation    Previous [facility] of [facility] standard [facility]    PSA 5.89 on 5/16/2024    PSA 5.77 on 6/13/2024    Prostate MRI 6/24/2024: PI-RADS 4 lesion right base posterior lateral peripheral zone; negative EPE/SVI/LAD/bony metastasis; prostate 49.7 g; 2.5 cm nick stone calculus in bladder      Patient consented for transperineal prostate biopsy.    Additionally, patient was consented for MRI fusion prostate biopsy.    I explained that this procedure would be performed under anesthesia in the operating room.  I explained that this would be a same-day procedure and he would go home later on the day of surgery.    I explained that for the MRI fusion portion of the procedure, software matches up his PI-RADS lesions seen on most recent MRI with the ultrasound that we would obtain on the day of procedure.  This allows us to specifically target the PI-RADS lesions.    I explained that during the procedure, we would place a transrectal ultrasound to assess the size and shape of the prostate.  The ultrasound will  then help guide biopsy needle placement into the specified areas of the prostate.     I explained that the needle sticks would be through the patient's perineum, which is the area between the scrotum and anus. I also explained that this was an alternate approach to the historyically used transrectal prostate biopsy. I explained that the advantages to the transperineal approach include better targeting of lesions previously noted on imaging. Additionally, transperineal approaches have significantly lower risk of infection compared to traditional transrectal approach.    We did review the risks of the transperineal prostate biopsy. These risks include but are not limited to: risks of general anesthesia (although it is typically performed under light sedation), infection (although it is significantly lower compared to traditional transrectal approach), bleeding at skin puncture sites and urine, urethral injury which would require Mathew catheter for a few days postoperatively, blood in semen, sexual dysfunction, false negative results.    Consent signed. All questions were answered.        PLAN:     -preop ancef  -OR plan as above  -dc home from pacu

## 2024-09-09 PROCEDURE — G0416 PROSTATE BIOPSY, ANY MTHD: HCPCS | Performed by: STUDENT IN AN ORGANIZED HEALTH CARE EDUCATION/TRAINING PROGRAM

## 2024-09-09 PROCEDURE — 88344 IMHCHEM/IMCYTCHM EA MLT ANTB: CPT | Performed by: STUDENT IN AN ORGANIZED HEALTH CARE EDUCATION/TRAINING PROGRAM

## 2024-09-24 ENCOUNTER — OFFICE VISIT (OUTPATIENT)
Dept: UROLOGY | Facility: AMBULATORY SURGERY CENTER | Age: 69
End: 2024-09-24
Payer: MEDICARE

## 2024-09-24 VITALS
BODY MASS INDEX: 26.83 KG/M2 | WEIGHT: 187 LBS | OXYGEN SATURATION: 99 % | DIASTOLIC BLOOD PRESSURE: 80 MMHG | SYSTOLIC BLOOD PRESSURE: 122 MMHG | HEART RATE: 73 BPM

## 2024-09-24 DIAGNOSIS — C61 PROSTATE CANCER (HCC): Primary | ICD-10-CM

## 2024-09-24 PROCEDURE — 99215 OFFICE O/P EST HI 40 MIN: CPT | Performed by: UROLOGY

## 2024-09-24 NOTE — ASSESSMENT & PLAN NOTE
Impression: New diagnosis small volume [place] 3+3 = 6/10 prostate cancer    Plan: I had a lengthy discussion with the patient and his wife in the office today regarding his new diagnosis of [place] 6 prostate cancer.  We discussed that this is low risk grade group 1 disease as only 1 core was positive on his transperineal fusion biopsy.  Although treatment options such as surgery or radiation are available, first-line therapy that I recommend is active surveillance.  As part of active surveillance we discussed routine PSA testing over the course of the next year along with a multiparametric MRI of the prostate within a year as well as a confirmatory biopsy at 1 years time.  In addition I will send his pathology for decipher genomic testing.  The patient understands the low risk associated with disease progression on active surveillance but risk nonetheless.  He is amenable with active surveillance.    Orders:    PSA Total, Diagnostic; Future

## 2024-09-24 NOTE — PROGRESS NOTES
Ambulatory Visit  Name: Terell Teague      : 1955      MRN: [id number]  Encounter Provider: Curt Bruce MD  Encounter Date: 2024   Encounter department: [facility] [place]    Assessment & Plan  Prostate cancer (HCC)  Impression: New diagnosis small volume [place] 3+3 = 6/10 prostate cancer    Plan: I had a lengthy discussion with the patient and his wife in the office today regarding his new diagnosis of Heriberto 6 prostate cancer.  We discussed that this is low risk grade group 1 disease as only 1 core was positive on his transperineal fusion biopsy.  Although treatment options such as surgery or radiation are available, first-line therapy that I recommend is active surveillance.  As part of active surveillance we discussed routine PSA testing over the course of the next year along with a multiparametric MRI of the prostate within a year as well as a confirmatory biopsy at 1 years time.  In addition I will send his pathology for decipher genomic testing.  The patient understands the low risk associated with disease progression on active surveillance but risk nonetheless.  He is amenable with active surveillance.    Orders:    PSA Total, Diagnostic; Future      History of Present Illness     Terell Teague is a 69 y.o. male who presents after an MRI fusion biopsy.  He is known to me with an elevated PSA of 6.  He previously had a prostate biopsy performed in [place] which was negative for malignancy.  I performed a multiparametric MRI of the prostate at [facility] which revealed PI-RADS 4 scoring.  He recently underwent an MRI fusion biopsy and returns in follow-up today.    Pathology reveals a single core in the right anterior lateral prostate showing Herib[place] 3+3 = 6/10 prostate cancer.  12.5% of the core was involved by tumor.  No other cores were positive      Review of Systems          Objective     /80 (BP Location: Left arm, Patient  Position: Sitting, Cuff Size: Large)   Pulse 73   Wt 84.8 kg (187 lb)   SpO2 99%   BMI 26.83 kg/m²   Physical Exam on examination he is in no acute distress.  Gait normal.  Affect normal    Results  Lab Results   Component Value Date    PSA 5.772 (H) 06/13/2024    PSA 5.89 (H) 05/16/2024     Lab Results   Component Value Date    CALCIUM 9.4 08/19/2024    K 3.7 08/19/2024    CO2 30 08/19/2024     08/19/2024    BUN 16 08/19/2024    CREATININE 0.95 08/19/2024     Lab Results   Component Value Date    WBC 8.92 08/19/2024    HGB 15.1 08/19/2024    HCT 46.0 08/19/2024    MCV 88 08/19/2024     08/19/2024       Office Urine Dip  No results found for this or any previous visit (from the past 1 hour(s)).]    Administrative Statements

## 2024-09-25 ENCOUNTER — TELEPHONE (OUTPATIENT)
Dept: UROLOGY | Facility: AMBULATORY SURGERY CENTER | Age: 69
End: 2024-09-25

## 2024-09-25 NOTE — TELEPHONE ENCOUNTER
----- Message from Curt Bruce MD sent at 9/24/2024  2:40 PM EDT -----  Please send path [phone] on [facility] for Decipher testing.  Thank you.    FT

## 2024-10-08 ENCOUNTER — TELEPHONE (OUTPATIENT)
Age: 69
End: 2024-10-08

## 2024-10-08 NOTE — TELEPHONE ENCOUNTER
Pt calling in regards to upcoming appt with Dr Bruce on 10/29.    Appt notes say it is for MRI fusion pathology but pt already had appt on 9/24/24 to review pathology. Pt is asking if appt can be cx or if provider would still like to see him in October.    Pt call back- [phone]

## 2024-10-09 NOTE — TELEPHONE ENCOUNTER
Patient calling in again in regards to Decipher Test. Explained the test and answered his questions.    He states that the appointment on 10/29/24 took him by surprise as he was not aware of the appointment and not aware of this test being run    He is also requesting a copy of this test be emailed to him, [email or web address]    He is requesting if Dr. Bruce can call him instead with the results and if he is agreeable then to cancel the appointment.

## 2024-10-09 NOTE — TELEPHONE ENCOUNTER
PT requesting a call back.  PT is confused as to why he needs to come in on 10/29/24.  I explained that this is the results for the Decipher testing.  And the last appt was for his pathology.  PT is asking what the Decipher testing was    Please call pt back at [phone]

## 2024-10-10 ENCOUNTER — LAB REQUISITION (OUTPATIENT)
Dept: LAB | Facility: HOSPITAL | Age: 69
End: 2024-10-10

## 2024-10-10 DIAGNOSIS — R97.20 ELEVATED PROSTATE SPECIFIC ANTIGEN (PSA): ICD-10-CM

## 2024-10-10 LAB — SCAN RESULT: NORMAL

## 2024-10-10 NOTE — TELEPHONE ENCOUNTER
Called patient and informed that after speaking with Dr. Bruce, we will cancel his appt for the Decipher testing review and Dr. Bruce will call patient. Patient never received email. Will resend.

## 2024-10-17 ENCOUNTER — TELEPHONE (OUTPATIENT)
Dept: UROLOGY | Facility: CLINIC | Age: 69
End: 2024-10-17

## 2024-10-17 NOTE — TELEPHONE ENCOUNTER
Today I called [facility] to discuss his decipher genomic testing.  I recently saw him in the office in September 2024 with a new diagnosis of small-volume Heriberto 3+3 = 6/10 prostate cancer.  He has opted for active surveillance.  Decipher genomic testing has a low decipher score.  10-year risk of metastasis with standard therapy is 0.6%.  15-year cancer specific mortality with standard therapy is 0.4% and his risk of abnormal pathology at the time of prostatectomy is less than 8%.  I called [facility] to discuss his favorable decipher.  Patient was appreciative.

## 2024-10-28 ENCOUNTER — TELEPHONE (OUTPATIENT)
Age: 69
End: 2024-10-28

## 2024-12-31 ENCOUNTER — APPOINTMENT (OUTPATIENT)
Dept: LAB | Facility: CLINIC | Age: 69
End: 2024-12-31
Payer: MEDICARE

## 2024-12-31 DIAGNOSIS — C61 PROSTATE CANCER (HCC): ICD-10-CM

## 2024-12-31 LAB — PSA SERPL-MCNC: 5.71 NG/ML (ref 0–4)

## 2024-12-31 PROCEDURE — 36415 COLL VENOUS BLD VENIPUNCTURE: CPT

## 2024-12-31 PROCEDURE — 84153 ASSAY OF PSA TOTAL: CPT

## 2025-01-03 ENCOUNTER — NURSE TRIAGE (OUTPATIENT)
Age: 70
End: 2025-01-03

## 2025-01-03 NOTE — TELEPHONE ENCOUNTER
"Answer Assessment - Initial Assessment Questions  1. REASON FOR CALL: \"What is the main reason for your call?\" or \"How can I best help you?\"      Patient called in asking for his PSA number from most recent lab draw. Informed him of PSA number. No further assistance needed.    Protocols used: Information Only Call - No Triage-Adult-[place]    "

## 2025-01-14 ENCOUNTER — TELEPHONE (OUTPATIENT)
Age: 70
End: 2025-01-14

## 2025-01-14 NOTE — TELEPHONE ENCOUNTER
Spoke with pt and confirming upcoming procedure with Dr. Miller on 01/30 at [facility] location, pt does not have any questions at the time.

## 2025-01-28 ENCOUNTER — OFFICE VISIT (OUTPATIENT)
Dept: UROLOGY | Facility: AMBULATORY SURGERY CENTER | Age: 70
End: 2025-01-28
Payer: MEDICARE

## 2025-01-28 VITALS
HEIGHT: 70 IN | SYSTOLIC BLOOD PRESSURE: 110 MMHG | WEIGHT: 187 LBS | HEART RATE: 75 BPM | BODY MASS INDEX: 26.77 KG/M2 | DIASTOLIC BLOOD PRESSURE: 78 MMHG | OXYGEN SATURATION: 97 %

## 2025-01-28 DIAGNOSIS — C18.2 MALIGNANT NEOPLASM OF ASCENDING COLON (HCC): ICD-10-CM

## 2025-01-28 DIAGNOSIS — C61 PROSTATE CANCER (HCC): Primary | ICD-10-CM

## 2025-01-28 PROCEDURE — 99213 OFFICE O/P EST LOW 20 MIN: CPT | Performed by: UROLOGY

## 2025-01-28 NOTE — ASSESSMENT & PLAN NOTE
Orders:  •  Basic metabolic panel; Future  •  MRI prostate multiparametric wo w contrast; Future

## 2025-01-28 NOTE — PROGRESS NOTES
Name: Terell Teague      : 1955      MRN: [id number]  Encounter Provider: Curt Bruce MD  Encounter Date: 2025   Encounter department: [facility] [place]  :  Assessment & Plan  Prostate cancer (HCC)    Orders:  •  Basic metabolic panel; Future  •  MRI prostate multiparametric wo w contrast; Future    Malignant neoplasm of ascending colon (HCC)         Impression: Heriberto 6 prostate cancer on active surveillance    Plan: Today we discussed his PSA which is stable at 5.7.  In addition we reviewed his decipher testing which shows low risk disease.  I recommend that he continue with active surveillance.  Follow-up will be in 6 months with a PSA, basic metabolic panel, and multiparametric MRI of the prostate.  He will then need a confirmatory biopsy by the end of .  I do not recommend robot-assisted laparoscopic prostatectomy or radiation at this time.  He understands the risk of disease progression on active surveillance.  He is amenable with this plan and wishes to continue with observation.    History of Present Illness   Terell Teague is a 69 y.o. male who presents for follow-up of his [place] 3+3 = 6/10 prostate cancer.  He had an elevated PSA as high as 6.  He had a prostate biopsy previously performed in the [facility] which was negative for malignancy.  I performed a multiparametric MRI of the prostate which revealed a PI-RADS 4 scoring.  This was in 2024.  This revealed a 50 g gland.  There was no evidence of extraprostatic disease at that time.  He subsequent went on to have an MRI transperineal fusion biopsy performed in the follow-up .  Pathology revealed a single core [place] 3+3 = 6/10 prostate cancer involving 12% of the core.  Active surveillance has been recommended.  He returns in follow-up today.  His most recent PSA level is stable at 5.706.  He also recently had decipher testing.  Decipher score and testing reveals low  "risk disease amenable to active surveillance.    We discussed that active surveillance is first-line recommendation for his grade group 1 very low risk prostate cancer with favorable decipher testing based on AUA and NCCN guidelines.    Review of Systems       Objective   /78 (Patient Position: Sitting, Cuff Size: Adult)   Pulse 75   Ht 5' 10\" (1.778 m)   Wt 84.8 kg (187 lb)   SpO2 97%   BMI 26.83 kg/m²     Physical Exam on examination he is in no acute distress.  Gait normal.  Affect normal    Results  Lab Results   Component Value Date    PSA 5.706 (H) 12/31/2024    PSA 5.772 (H) 06/13/2024    PSA 5.89 (H) 05/16/2024     Lab Results   Component Value Date    CALCIUM 9.4 08/19/2024    K 3.7 08/19/2024    CO2 30 08/19/2024     08/19/2024    BUN 16 08/19/2024    CREATININE 0.95 08/19/2024     Lab Results   Component Value Date    WBC 8.92 08/19/2024    HGB 15.1 08/19/2024    HCT 46.0 08/19/2024    MCV 88 08/19/2024     08/19/2024       Office Urine Dip  No results found for this or any previous visit (from the past hour).]      "

## 2025-01-30 ENCOUNTER — TELEPHONE (OUTPATIENT)
Age: 70
End: 2025-01-30

## 2025-01-30 NOTE — TELEPHONE ENCOUNTER
Pt's wife Lois rescheduled pt's EGD fro 2/18/25 w/ Dr. Miller and is aware that it will be at [facility], [address], [place]. Per wife they have to drive an hour either way and informed me she wrote address down.    I called [facility] and informed Silke.

## 2025-02-04 ENCOUNTER — ANESTHESIA EVENT (OUTPATIENT)
Dept: ANESTHESIOLOGY | Facility: HOSPITAL | Age: 70
End: 2025-02-04

## 2025-02-04 ENCOUNTER — ANESTHESIA (OUTPATIENT)
Dept: ANESTHESIOLOGY | Facility: HOSPITAL | Age: 70
End: 2025-02-04

## 2025-02-17 ENCOUNTER — ANESTHESIA (OUTPATIENT)
Dept: ANESTHESIOLOGY | Facility: HOSPITAL | Age: 70
End: 2025-02-17

## 2025-02-17 ENCOUNTER — ANESTHESIA EVENT (OUTPATIENT)
Dept: ANESTHESIOLOGY | Facility: HOSPITAL | Age: 70
End: 2025-02-17

## 2025-02-17 RX ORDER — SODIUM CHLORIDE, SODIUM LACTATE, POTASSIUM CHLORIDE, CALCIUM CHLORIDE 600; 310; 30; 20 MG/100ML; MG/100ML; MG/100ML; MG/100ML
125 INJECTION, SOLUTION INTRAVENOUS CONTINUOUS
Status: CANCELLED | OUTPATIENT
Start: 2025-02-17

## 2025-02-17 NOTE — ANESTHESIA PREPROCEDURE EVALUATION
Procedure:  PRE-OP ONLY    Relevant Problems   ANESTHESIA (within normal limits)      CARDIO   (+) Hypertension      ENDO   (+) Hypothyroidism      GI/HEPATIC   (+) Malignant neoplasm of ascending colon (HCC)      /RENAL   (+) Prostate cancer (HCC)      GYN (within normal limits)      HEMATOLOGY (within normal limits)      MUSCULOSKELETAL (within normal limits)      NEURO/PSYCH (within normal limits)      PULMONARY (within normal limits)             Anesthesia Plan  ASA Score- 2     Anesthesia Type- IV sedation with anesthesia with ASA Monitors.         Additional Monitors:     Airway Plan:            Plan Factors-Exercise tolerance (METS): >4 METS.    Chart reviewed.    Patient summary reviewed.    Patient is not a current smoker.              Induction- intravenous.    Postoperative Plan-         Informed Consent- Anesthetic plan and risks discussed with patient.        NPO Status:  No vitals data found for the desired time range.

## 2025-02-18 ENCOUNTER — HOSPITAL ENCOUNTER (OUTPATIENT)
Dept: GASTROENTEROLOGY | Facility: MEDICAL CENTER | Age: 70
Setting detail: OUTPATIENT SURGERY
Discharge: HOME | End: 2025-02-18
Attending: INTERNAL MEDICINE
Payer: MEDICARE

## 2025-02-18 ENCOUNTER — ANESTHESIA (OUTPATIENT)
Dept: GASTROENTEROLOGY | Facility: MEDICAL CENTER | Age: 70
End: 2025-02-18
Payer: MEDICARE

## 2025-02-18 ENCOUNTER — ANESTHESIA EVENT (OUTPATIENT)
Dept: GASTROENTEROLOGY | Facility: MEDICAL CENTER | Age: 70
End: 2025-02-18
Payer: MEDICARE

## 2025-02-18 VITALS
TEMPERATURE: 99 F | RESPIRATION RATE: 16 BRPM | OXYGEN SATURATION: 94 % | DIASTOLIC BLOOD PRESSURE: 66 MMHG | HEART RATE: 65 BPM | SYSTOLIC BLOOD PRESSURE: 114 MMHG

## 2025-02-18 DIAGNOSIS — K22.70 BARRETT'S ESOPHAGUS WITHOUT DYSPLASIA: ICD-10-CM

## 2025-02-18 DIAGNOSIS — K21.9 GASTROESOPHAGEAL REFLUX DISEASE, UNSPECIFIED WHETHER ESOPHAGITIS PRESENT: ICD-10-CM

## 2025-02-18 PROCEDURE — 88305 TISSUE EXAM BY PATHOLOGIST: CPT | Performed by: SPECIALIST

## 2025-02-18 PROCEDURE — 43239 EGD BIOPSY SINGLE/MULTIPLE: CPT | Performed by: INTERNAL MEDICINE

## 2025-02-18 RX ORDER — SODIUM CHLORIDE, SODIUM LACTATE, POTASSIUM CHLORIDE, CALCIUM CHLORIDE 600; 310; 30; 20 MG/100ML; MG/100ML; MG/100ML; MG/100ML
125 INJECTION, SOLUTION INTRAVENOUS CONTINUOUS
Status: DISCONTINUED | OUTPATIENT
Start: 2025-02-18 | End: 2025-02-22 | Stop reason: HOSPADM

## 2025-02-18 RX ORDER — PROPOFOL 10 MG/ML
INJECTION, EMULSION INTRAVENOUS AS NEEDED
Status: DISCONTINUED | OUTPATIENT
Start: 2025-02-18 | End: 2025-02-18

## 2025-02-18 RX ADMIN — PROPOFOL 200 MG: 10 INJECTION, EMULSION INTRAVENOUS at 08:42

## 2025-02-18 RX ADMIN — SODIUM CHLORIDE, SODIUM LACTATE, POTASSIUM CHLORIDE, AND CALCIUM CHLORIDE 125 ML/HR: .6; .31; .03; .02 INJECTION, SOLUTION INTRAVENOUS at 08:25

## 2025-02-18 NOTE — ANESTHESIA POSTPROCEDURE EVALUATION
Post-Op Assessment Note    CV Status:  Stable    Pain management: adequate       Mental Status:  Sleepy     Post Op Vitals Reviewed: Yes    No anethesia notable event occurred.    Staff: CRNA           Last Filed PACU Vitals:  Vitals Value Taken Time   Temp     Pulse     BP     Resp     SpO2

## 2025-02-18 NOTE — ANESTHESIA POSTPROCEDURE EVALUATION
Post-Op Assessment Note    Last Filed PACU Vitals:  Vitals Value Taken Time   Temp 99 °F (37.2 °C) 02/18/25 0854   Pulse 65 02/18/25 0906   /66 02/18/25 0906   Resp 16 02/18/25 0906   SpO2 94 % 02/18/25 0906       Modified Norman:     Vitals Value Taken Time   Activity 2 02/18/25 0907   Respiration 2 02/18/25 0907   Circulation 2 02/18/25 0907   Consciousness 2 02/18/25 0907   Oxygen Saturation 2 02/18/25 0907     Modified Norman Score: 10

## 2025-02-18 NOTE — H&P
History and Physical - [facility]  Terell Teague 69 y.o. male MRN: [id number]                  HPI: Terell Teague is a 69 y.o. year old male who presents for varela's esophagus      REVIEW OF SYSTEMS: Per the HPI, and otherwise unremarkable.    Historical Information   Past Medical History:   Diagnosis Date    Colon cancer (HCC) 6/1/2011    GERD (gastroesophageal reflux disease) 2/1/2022    Hypertension 1/2/2021     Past Surgical History:   Procedure Laterality Date    ABDOMINAL SURGERY  6/6/2011    COLONOSCOPY  5/1/2019    [place] PROSTATE NEEDLE BIOPSY ANY APPROACH N/A 9/4/2024    Procedure: TRANSPERINEAL MRI FUSION BX PROSTATE;  Surgeon: Torres Zavala DO;  Location: [facility];  Service: Urology    SUBTOTAL COLECTOMY      TOTAL THYROIDECTOMY       Social History   Social History     Substance and Sexual Activity   Alcohol Use Yes    Alcohol/week: 3.0 standard drinks of alcohol    Types: 3 Cans of beer per week    Comment: Social      Social History     Substance and Sexual Activity   Drug Use Never     Social History     Tobacco Use   Smoking Status Never   Smokeless Tobacco Never     Family History   Problem Relation Age of Onset    Thyroid cancer Brother     Thyroid disease unspecified Brother     Thyroid cancer Family     Thyroid disease unspecified Family     Breast cancer Mother     Lung cancer Mother     Ovarian cancer Mother     Lung cancer Father     Cancer Father     Inflammatory bowel disease Father        Meds/Allergies     Not in a hospital admission.    Allergies   Allergen Reactions    Sulfa Antibiotics Hives and Rash       Objective     Blood pressure 155/87, pulse 80, temperature (!) 96.7 °F (35.9 °C), temperature source Temporal, resp. rate 16, SpO2 96%.      PHYSICAL EXAMINATION:    General Appearance:   Alert, cooperative, no distress   HEENT:  Normocephalic, atraumatic, anicteric. Neck supple, symmetrical, trachea midline.   Lungs:   Equal chest rise  and unlabored breathing, normal effort, no coughing.   Cardiovascular:   No visualized JVD.   Abdomen:   No abdominal distension.   Skin:   No jaundice, rashes, or lesions.    Musculoskeletal:   Normal range of motion visualized.   Psych:  Normal affect and normal insight.   Neuro:  Alert and appropriate.           ASSESSMENT/PLAN:  This is a 69 y.o. year old male here for EGD, and he is stable and optimized for his procedure.

## 2025-02-18 NOTE — ANESTHESIA PREPROCEDURE EVALUATION
Procedure:  EGD    Relevant Problems   CARDIO   (+) Hypertension      ENDO   (+) Hypothyroidism      GI/HEPATIC   (+) Malignant neoplasm of ascending colon (HCC)      /RENAL   (+) Prostate cancer (HCC)        Physical Exam    Airway    Mallampati score: II  TM Distance: >3 FB  Neck ROM: full     Dental   No notable dental hx     Cardiovascular  Rhythm: regular, Rate: normal, Cardiovascular exam normal    Pulmonary  Pulmonary exam normal Breath sounds clear to auscultation    Other Findings        Anesthesia Plan  ASA Score- 2     Anesthesia Type- IV sedation with anesthesia with ASA Monitors.         Additional Monitors:     Airway Plan:            Plan Factors-Exercise tolerance (METS): >4 METS.    Chart reviewed.    Patient summary reviewed.    Patient is not a current smoker.      Obstructive sleep apnea risk education given perioperatively.        Induction- intravenous.    Postoperative Plan-         Informed Consent- Anesthetic plan and risks discussed with patient.        NPO Status:  No vitals data found for the desired time range.

## 2025-02-21 ENCOUNTER — RESULTS FOLLOW-UP (OUTPATIENT)
Age: 70
End: 2025-02-21

## 2025-02-21 ENCOUNTER — TELEPHONE (OUTPATIENT)
Age: 70
End: 2025-02-21

## 2025-02-21 PROCEDURE — 88305 TISSUE EXAM BY PATHOLOGIST: CPT | Performed by: SPECIALIST

## 2025-02-21 NOTE — TELEPHONE ENCOUNTER
Patients GI provider:  Dr. MOLINA    Number to return call: ([phone]    Reason for call: Pt calling office for results. Please review procedure results and contact patient.    Scheduled procedure/appointment date if applicable: Apt/procedure

## 2025-07-02 ENCOUNTER — TELEPHONE (OUTPATIENT)
Age: 70
End: 2025-07-02

## 2025-07-02 ENCOUNTER — HOSPITAL ENCOUNTER (OUTPATIENT)
Dept: RADIOLOGY | Age: 70
Discharge: HOME | End: 2025-07-02
Attending: UROLOGY
Payer: MEDICARE

## 2025-07-02 ENCOUNTER — APPOINTMENT (OUTPATIENT)
Dept: LAB | Age: 70
End: 2025-07-02
Attending: UROLOGY
Payer: MEDICARE

## 2025-07-02 DIAGNOSIS — C61 PROSTATE CANCER (HCC): Primary | ICD-10-CM

## 2025-07-02 DIAGNOSIS — C61 PROSTATE CANCER (HCC): ICD-10-CM

## 2025-07-02 LAB
ANION GAP SERPL CALCULATED.3IONS-SCNC: 10 MMOL/L (ref 4–13)
BUN SERPL-MCNC: 15 MG/DL (ref 5–25)
CALCIUM SERPL-MCNC: 9.1 MG/DL (ref 8.4–10.2)
CHLORIDE SERPL-SCNC: 103 MMOL/L (ref 96–108)
CO2 SERPL-SCNC: 29 MMOL/L (ref 21–32)
CREAT SERPL-MCNC: 0.89 MG/DL (ref 0.6–1.3)
GFR SERPL CREATININE-BSD FRML MDRD: 86 ML/MIN/1.73SQ M
GLUCOSE SERPL-MCNC: 80 MG/DL (ref 65–140)
POTASSIUM SERPL-SCNC: 3.6 MMOL/L (ref 3.5–5.3)
PSA SERPL-MCNC: 5.4 NG/ML (ref 0–4)
SODIUM SERPL-SCNC: 142 MMOL/L (ref 135–147)

## 2025-07-02 PROCEDURE — 80048 BASIC METABOLIC PNL TOTAL CA: CPT

## 2025-07-02 PROCEDURE — 76377 3D RENDER W/INTRP POSTPROCES: CPT

## 2025-07-02 PROCEDURE — A9585 GADOBUTROL INJECTION: HCPCS | Performed by: UROLOGY

## 2025-07-02 PROCEDURE — 36415 COLL VENOUS BLD VENIPUNCTURE: CPT

## 2025-07-02 PROCEDURE — 84153 ASSAY OF PSA TOTAL: CPT

## 2025-07-02 PROCEDURE — 72197 MRI PELVIS W/O & W/DYE: CPT

## 2025-07-02 RX ORDER — GADOBUTROL 604.72 MG/ML
8 INJECTION INTRAVENOUS
Status: COMPLETED | OUTPATIENT
Start: 2025-07-02 | End: 2025-07-02

## 2025-07-02 RX ADMIN — GADOBUTROL 8 ML: 604.72 INJECTION INTRAVENOUS at 09:46

## 2025-07-03 ENCOUNTER — RESULTS FOLLOW-UP (OUTPATIENT)
Dept: UROLOGY | Facility: MEDICAL CENTER | Age: 70
End: 2025-07-03

## 2025-07-03 NOTE — TELEPHONE ENCOUNTER
Called and spoke to patient and relayed results. Patient states he had PSA done about a month and a half ago which was around 4.8. Provided office fax number for patient to have those records faxed to our office to be scanned into his chart.       ----- Message from Jacobo Peterson PA-C sent at 7/3/2025  2:37 PM EDT -----  Please call the patient advise him I reviewed his most recent PSA and it returned overall stable at a value of 5.404 despite being elevated.  No repeat PSA testing required at this time we will   discuss in more detail at his follow-up visit.  ----- Message -----  From: Lab, Background User  Sent: 7/2/2025   3:30 PM EDT  To: Jacobo Peterson PA-C

## 2025-07-30 ENCOUNTER — OFFICE VISIT (OUTPATIENT)
Dept: UROLOGY | Facility: AMBULATORY SURGERY CENTER | Age: 70
End: 2025-07-30
Payer: MEDICARE

## 2025-07-30 VITALS
SYSTOLIC BLOOD PRESSURE: 120 MMHG | OXYGEN SATURATION: 97 % | DIASTOLIC BLOOD PRESSURE: 80 MMHG | WEIGHT: 184 LBS | HEIGHT: 70 IN | BODY MASS INDEX: 26.34 KG/M2 | HEART RATE: 65 BPM

## 2025-07-30 DIAGNOSIS — C61 PROSTATE CANCER (HCC): Primary | ICD-10-CM

## 2025-07-30 PROCEDURE — 99214 OFFICE O/P EST MOD 30 MIN: CPT | Performed by: UROLOGY

## 2025-07-30 RX ORDER — MONTELUKAST SODIUM 10 MG/1
10 TABLET ORAL DAILY
COMMUNITY
Start: 2025-05-05

## 2025-07-30 RX ORDER — ONDANSETRON 8 MG/1
TABLET, FILM COATED ORAL
COMMUNITY

## 2025-07-30 RX ORDER — CYCLOBENZAPRINE HCL 10 MG
TABLET ORAL
COMMUNITY

## 2025-07-30 RX ORDER — CEFAZOLIN SODIUM 2 G/50ML
2000 SOLUTION INTRAVENOUS ONCE
OUTPATIENT
Start: 2025-07-30 | End: 2025-07-30

## 2025-07-30 RX ORDER — SODIUM CHLORIDE 9 MG/ML
125 INJECTION, SOLUTION INTRAVENOUS CONTINUOUS
OUTPATIENT
Start: 2025-07-30

## 2025-07-30 RX ORDER — LISINOPRIL 40 MG/1
TABLET ORAL
COMMUNITY
Start: 2025-06-26

## 2025-08-05 ENCOUNTER — TELEPHONE (OUTPATIENT)
Age: 70
End: 2025-08-05

## 2025-08-07 ENCOUNTER — PREP FOR PROCEDURE (OUTPATIENT)
Dept: UROLOGY | Facility: AMBULATORY SURGERY CENTER | Age: 70
End: 2025-08-07

## 2025-08-07 DIAGNOSIS — N21.0 BLADDER STONE: Primary | ICD-10-CM

## 2025-08-07 DIAGNOSIS — Z01.812 PRE-OPERATIVE LABORATORY EXAMINATION: ICD-10-CM

## 2025-08-07 DIAGNOSIS — Z01.810 PRE-OPERATIVE CARDIOVASCULAR EXAMINATION: ICD-10-CM

## 2025-08-07 DIAGNOSIS — C61 PROSTATE CANCER (HCC): ICD-10-CM

## 2025-08-07 DIAGNOSIS — R39.89 SUSPECTED UTI: ICD-10-CM

## (undated) DEVICE — ULTRASOUND GEL STERILE FOIL PK

## (undated) DEVICE — 3M™ TEGADERM™ TRANSPARENT FILM DRESSING FRAME STYLE, 1628, 6 IN X 8 IN (15 CM X 20 CM), 10/CT 8CT/CASE: Brand: 3M™ TEGADERM™

## (undated) DEVICE — GLOVE SRG BIOGEL ECLIPSE 7.5

## (undated) DEVICE — RENTAL PROBE ULTRASOUND DROP IN BK5000

## (undated) DEVICE — RAZOR STERILE

## (undated) DEVICE — NEEDLE 25G X 1 1/2

## (undated) DEVICE — CHLORAPREP HI-LITE 26ML ORANGE

## (undated) DEVICE — GAUZE SPONGES,16 PLY: Brand: CURITY

## (undated) DEVICE — TELFA NON-ADHERENT ABSORBENT DRESSING: Brand: TELFA

## (undated) DEVICE — MAX-CORE® DISPOSABLE CORE BIOPSY INSTRUMENT, 18G X 25CM: Brand: MAX-CORE

## (undated) DEVICE — SYSTEM TRANSPERINEAL ACCESS PRECISIONPOINT

## (undated) DEVICE — GLOVE INDICATOR PI UNDERGLOVE SZ 8 BLUE

## (undated) DEVICE — SPECIMEN CONTAINER STERILE PEEL PACK

## (undated) DEVICE — SYRINGE 10ML LL

## (undated) DEVICE — UTILITY MARKER,BLACK WITH LABELS: Brand: DEVON

## (undated) DEVICE — PREP PAD BNS: Brand: CONVERTORS

## (undated) DEVICE — HOLDER PROBE URONAV BK8848

## (undated) DEVICE — HEAVY DUTY TABLE COVER: Brand: CONVERTORS